# Patient Record
Sex: FEMALE | Race: WHITE | NOT HISPANIC OR LATINO | Employment: OTHER | URBAN - METROPOLITAN AREA
[De-identification: names, ages, dates, MRNs, and addresses within clinical notes are randomized per-mention and may not be internally consistent; named-entity substitution may affect disease eponyms.]

---

## 2017-04-26 DIAGNOSIS — G89.4 CHRONIC PAIN SYNDROME: ICD-10-CM

## 2017-04-26 DIAGNOSIS — G25.81 RESTLESS LEGS SYNDROME (RLS): ICD-10-CM

## 2017-04-26 RX ORDER — TRAMADOL HYDROCHLORIDE 50 MG/1
TABLET ORAL
Qty: 180 TABLET | Refills: 0 | Status: SHIPPED | OUTPATIENT
Start: 2017-04-26 | End: 2017-05-03

## 2017-04-26 NOTE — TELEPHONE ENCOUNTER
traMADol (ULTRAM) 50 MG tablet      Last Written Prescription Date:  11/22/16  Last Fill Quantity: 180,   # refills: 5  Last Office Visit with [facility] or [facility] prescribing provider: 9/14/16  Future Office visit:       Routing refill request to provider for review/approval because:  Drug not on the [facility] or [facility] refill protocol or controlled substance

## 2017-04-27 NOTE — TELEPHONE ENCOUNTER
rOPINIRole (REQUIP) 1 MG tablet     Last Written Prescription Date: 9/14/16  Last Fill Quantity: 90, # refills: 1  Last Office Visit with [facility], [facility] or [facility] prescribing provider: 9/14/16        BP Readings from Last 3 Encounters:   09/14/16 120/70   11/03/15 110/70   10/12/15 111/62           Masoud Faarax  [facility]

## 2017-05-01 RX ORDER — ROPINIROLE 1 MG/1
TABLET, FILM COATED ORAL
Qty: 30 TABLET | Refills: 0 | Status: SHIPPED | OUTPATIENT
Start: 2017-05-01 | End: 2017-11-05

## 2017-05-01 NOTE — TELEPHONE ENCOUNTER
Medication is being filled for 1 time sage refill only due to:  Patient due to be seen in clinic for provider visit.    Alonzo Aguirre RN

## 2017-05-03 DIAGNOSIS — G89.4 CHRONIC PAIN SYNDROME: ICD-10-CM

## 2017-05-03 RX ORDER — TRAMADOL HYDROCHLORIDE 50 MG/1
TABLET ORAL
Qty: 180 TABLET | Refills: 0 | Status: SHIPPED | OUTPATIENT
Start: 2017-05-03 | End: 2017-06-02

## 2017-05-03 NOTE — TELEPHONE ENCOUNTER
Written rx is faxed to [facility] pharmacy at fax # [phone] with start date of today 5/3/17.  Also added not for the pharmacy to call pt when medication is ready for pickup as pt will be going out of town tomorrow and need this med today.  Kelvin Witt,  [facility]

## 2017-06-02 DIAGNOSIS — G89.4 CHRONIC PAIN SYNDROME: ICD-10-CM

## 2017-06-02 RX ORDER — TRAMADOL HYDROCHLORIDE 50 MG/1
TABLET ORAL
Qty: 180 TABLET | Refills: 0 | Status: SHIPPED | OUTPATIENT
Start: 2017-06-02 | End: 2017-06-29

## 2017-06-02 NOTE — TELEPHONE ENCOUNTER
traMADol (ULTRAM) 50 MG tablet      Last Written Prescription Date:  05/03/17  Last Fill Quantity: 180,   # refills: 0  Last Office Visit with [facility] or [facility] prescribing provider: 09/14/16  Future Office visit:       Routing refill request to provider for review/approval because:  Drug not on the [facility], [facility] or [facility] refill protocol or controlled substance      [facility]

## 2017-06-29 DIAGNOSIS — G89.4 CHRONIC PAIN SYNDROME: ICD-10-CM

## 2017-06-29 RX ORDER — TRAMADOL HYDROCHLORIDE 50 MG/1
TABLET ORAL
Qty: 180 TABLET | Refills: 0 | Status: SHIPPED | OUTPATIENT
Start: 2017-06-29 | End: 2017-08-25

## 2017-06-29 NOTE — TELEPHONE ENCOUNTER
rx faxed to pharmacy    Faxed RX June 29, 2017 to fax number [phone]    Right Fax confirmed at 1129 AM    Daniella Montague

## 2017-06-29 NOTE — TELEPHONE ENCOUNTER
traMADol (ULTRAM) 50 MG tablet      Last Written Prescription Date:  06/02/17  Last Fill Quantity: 180,   # refills: 0  Last Office Visit with [facility] or [facility] prescribing provider: 09/14/16  Future Office visit:       Routing refill request to provider for review/approval because:  Drug not on the [facility], [facility] or [facility] refill protocol or controlled substance      Aubrie Souza  [facility]

## 2017-08-25 DIAGNOSIS — G89.4 CHRONIC PAIN SYNDROME: ICD-10-CM

## 2017-08-25 NOTE — TELEPHONE ENCOUNTER
traMADol (ULTRAM) 50 MG tablet      Last Written Prescription Date:  06/29/17  Last Fill Quantity: 180,   # refills: 0  Last Office Visit with [facility] or [facility] prescribing provider: 09/14/16  Future Office visit:       Routing refill request to provider for review/approval because:  Drug not on the [facility], [facility] or [facility] refill protocol or controlled substance      Aubrie Souza  [facility]

## 2017-08-26 RX ORDER — TRAMADOL HYDROCHLORIDE 50 MG/1
TABLET ORAL
Qty: 180 TABLET | Refills: 0 | Status: SHIPPED | OUTPATIENT
Start: 2017-08-26 | End: 2017-10-02

## 2017-10-02 ENCOUNTER — TELEPHONE (OUTPATIENT)
Dept: FAMILY MEDICINE | Facility: CLINIC | Age: 67
End: 2017-10-02

## 2017-10-02 DIAGNOSIS — G89.4 CHRONIC PAIN SYNDROME: ICD-10-CM

## 2017-10-02 NOTE — TELEPHONE ENCOUNTER
Tried to call number on file, male answered and informed me I had the wrong number.    Will send to reception team to have them send letter to remind patient she is due for her mammogram.    If patient calls please help her schedule her mammogram and update her phone number.

## 2017-10-02 NOTE — TELEPHONE ENCOUNTER
traMADol (ULTRAM) 50 MG tablet      Last Written Prescription Date:  08/26/17  Last Fill Quantity: 180,   # refills: 0  Last Office Visit with [facility] or [facility] prescribing provider: 09/14/16  Future Office visit:       Routing refill request to provider for review/approval because:  Drug not on the [facility], [facility] or [facility] refill protocol or controlled substance      Aubrie Souza  [facility]

## 2017-10-03 RX ORDER — TRAMADOL HYDROCHLORIDE 50 MG/1
TABLET ORAL
Qty: 180 TABLET | Refills: 0 | Status: SHIPPED | OUTPATIENT
Start: 2017-10-03 | End: 2017-11-05

## 2017-10-04 NOTE — TELEPHONE ENCOUNTER
Written rx faxed to the pharmacy, Pharmacy will contact pt when medication is ready for pickup.  Kelvin Witt,  [facility]

## 2017-11-05 ENCOUNTER — OFFICE VISIT (OUTPATIENT)
Dept: URGENT CARE | Facility: URGENT CARE | Age: 67
End: 2017-11-05
Payer: COMMERCIAL

## 2017-11-05 VITALS
DIASTOLIC BLOOD PRESSURE: 75 MMHG | WEIGHT: 171 LBS | OXYGEN SATURATION: 96 % | HEART RATE: 84 BPM | SYSTOLIC BLOOD PRESSURE: 144 MMHG | BODY MASS INDEX: 28.9 KG/M2 | TEMPERATURE: 98.3 F

## 2017-11-05 DIAGNOSIS — G89.4 CHRONIC PAIN SYNDROME: ICD-10-CM

## 2017-11-05 PROCEDURE — 99214 OFFICE O/P EST MOD 30 MIN: CPT | Performed by: NURSE PRACTITIONER

## 2017-11-05 RX ORDER — TRAMADOL HYDROCHLORIDE 50 MG/1
TABLET ORAL
Qty: 180 TABLET | Refills: 0 | Status: SHIPPED | OUTPATIENT
Start: 2017-11-05 | End: 2017-11-30

## 2017-11-05 ASSESSMENT — ENCOUNTER SYMPTOMS
BACK PAIN: 1
CARDIOVASCULAR NEGATIVE: 1
CONSTITUTIONAL NEGATIVE: 1
NEUROLOGICAL NEGATIVE: 1
GASTROINTESTINAL NEGATIVE: 1
RESPIRATORY NEGATIVE: 1
EYES NEGATIVE: 1
PSYCHIATRIC NEGATIVE: 1

## 2017-11-05 ASSESSMENT — PAIN SCALES - GENERAL: PAINLEVEL: SEVERE PAIN (7)

## 2017-11-05 NOTE — MR AVS SNAPSHOT
After Visit Summary   11/5/2017    Faviola Bolivar    MRN: [id number]           Patient Information     Date Of Birth          1950        Visit Information        Provider Department      11/5/2017 4:20 PM Antonella Edmondson APRN CNP [facility]        Today's Diagnoses     Chronic pain syndrome           Follow-ups after your visit        Who to contact     If you have questions or need follow up information about today's clinic visit or your schedule please contact [facility] directly at [phone].  Normal or non-critical lab and imaging results will be communicated to you by Broadlink[employer], letter or phone within 4 business days after the clinic has received the results. If you do not hear from us within 7 days, please contact the clinic through [employer] or phone. If you have a critical or abnormal lab result, we will notify you by phone as soon as possible.  Submit refill requests through [employer]egee or call your pharmacy and they will forward the refill request to us. Please allow 3 business days for your refill to be completed.          Additional Information About Your Visit        MyChart Information     [employer] gives you secure access to your electronic health record. If you see a primary care provider, you can also send messages to your care team and make appointments. If you have questions, please call your primary care clinic.  If you do not have a primary care provider, please call [phone] and they will assist you.        Care EveryWhere ID     This is your Care EveryWhere ID. This could be used by other organizations to access your [facility] medical records  [id number]        Your Vitals Were     Pulse Temperature Pulse Oximetry BMI (Body Mass Index)          84 98.3  F (36.8  C) (Oral) 96% 28.9 kg/m2         Blood Pressure from Last 3 Encounters:   11/05/17 144/75   09/14/16 120/70   11/03/15 110/70    Weight from Last 3 Encounters:   11/05/17 171  lb (77.6 kg)   09/14/16 167 lb (75.8 kg)   11/03/15 171 lb 5 oz (77.7 kg)              Today, you had the following     No orders found for display         Today's Medication Changes          These changes are accurate as of: 11/5/17  4:38 PM.  If you have any questions, ask your nurse or doctor.               Stop taking these medicines if you haven't already. Please contact your care team if you have questions.     aspirin 81 MG tablet   Stopped by:  Antonella Edmondson APRN CNP           diclofenac 1 % Gel topical gel   Commonly known as:  VOLTAREN   Stopped by:  Antonella Edmondson APRN CNP           rOPINIRole 1 MG tablet   Commonly known as:  REQUIP   Stopped by:  Antonella Edmondson APRN CNP                Where to get your medicines      Some of these will need a paper prescription and others can be bought over the counter.  Ask your nurse if you have questions.     Bring a paper prescription for each of these medications     traMADol 50 MG tablet                Primary Care Provider Office Phone # Fax #    Citlalli Akbar PA-C [phone]       [address]  [facility] [address]        Equal Access to Services     [facility]: Hadii aad ku hadasho Sojoshuaali, waaxda luqadaha, qaybta kaalmada adeegyada, joe jordan . So [facility] [phone].    ATENCIÓN: Si habla español, tiene a rowland disposición servicios gratuitos de asistencia lingüística. Llame al [phone].    We comply with applicable federal civil rights laws and [place] laws. We do not discriminate on the basis of race, color, national origin, age, disability, sex, sexual orientation, or gender identity.            Thank you!     Thank you for choosing [facility]  for your care. Our goal is always to provide you with excellent care. Hearing back from our patients is one way we can continue to improve our services. Please take a few minutes to complete the written survey that  you may receive in the mail after your visit with us. Thank you!             Your Updated Medication List - Protect others around you: Learn how to safely use, store and throw away your medicines at www.disposemymeds.org.          This list is accurate as of: 11/5/17  4:38 PM.  Always use your most recent med list.                   Brand Name Dispense Instructions for use Diagnosis    sertraline 50 MG tablet    ZOLOFT    90 tablet    TAKE 1 TABLET (50 MG) BY MOUTH DAILY    Major depressive disorder, single episode, moderate (H)       traMADol 50 MG tablet    ULTRAM    180 tablet    TAKE 1-2 TABLETS BY MOUTH UP TO THREE TIMES A DAY AS NEEDED FOR PAIN.    Chronic pain syndrome

## 2017-11-05 NOTE — NURSING NOTE
"Chief Complaint   Patient presents with     Back Pain       Initial /75 (BP Location: Left arm, Patient Position: Chair, Cuff Size: Adult Large)  Pulse 84  Temp 98.3  F (36.8  C) (Oral)  Wt 171 lb (77.6 kg)  SpO2 96%  BMI 28.9 kg/m2 Estimated body mass index is 28.9 kg/(m^2) as calculated from the following:    Height as of 9/14/16: 5' 4.5\" (1.638 m).    Weight as of this encounter: 171 lb (77.6 kg).  Medication Reconciliation: complete   Bobbi Johnson CMA      "

## 2017-11-05 NOTE — PROGRESS NOTES
SUBJECTIVE:   Faviola Bolivar is a 67 year old female presenting with a chief complaint of   Chief Complaint   Patient presents with     Back Pain   .    Onset of symptoms was 3 day(s) ago.  Course of illness is same.    Severity moderate  Current and Associated symptoms: back pain- lower left  Treatment measures tried include tramadol-2 tabsBID.  Predisposing factors include none.  This is chronic back pain, no new or acute pain.   Rx was sent over was not filled yet    Review of Systems   Constitutional: Negative.    HENT: Negative.    Eyes: Negative.    Respiratory: Negative.    Cardiovascular: Negative.    Gastrointestinal: Negative.    Genitourinary: Negative.    Musculoskeletal: Positive for back pain.   Skin: Negative.    Neurological: Negative.    Endo/Heme/Allergies: Negative.    Psychiatric/Behavioral: Negative.          Past Medical History:   Diagnosis Date     Fibromyalgia syndrome      Genital herpes in women      Hyperlipemia      Menopause      Moderate major depression (H)      Osteopenia      Restless legs      Current Outpatient Prescriptions   Medication Sig Dispense Refill     traMADol (ULTRAM) 50 MG tablet TAKE 1-2 TABLETS BY MOUTH UP TO THREE TIMES A DAY AS NEEDED FOR PAIN. 180 tablet 0     sertraline (ZOLOFT) 50 MG tablet TAKE 1 TABLET (50 MG) BY MOUTH DAILY 90 tablet 3     Social History   Substance Use Topics     Smoking status: Never Smoker     Smokeless tobacco: Never Used     Alcohol use Yes      Comment: rarely       OBJECTIVE  /75 (BP Location: Left arm, Patient Position: Chair, Cuff Size: Adult Large)  Pulse 84  Temp 98.3  F (36.8  C) (Oral)  Wt 171 lb (77.6 kg)  SpO2 96%  BMI 28.9 kg/m2    Physical Exam   Constitutional: She is oriented to person, place, and time and well-developed, well-nourished, and in no distress.   Eyes: Conjunctivae and EOM are normal. Pupils are equal, round, and reactive to light.   Neck: Normal range of motion. Neck supple.   Cardiovascular: Normal  rate, regular rhythm and normal heart sounds.    Pulmonary/Chest: Effort normal and breath sounds normal.   Musculoskeletal: Normal range of motion.   Lower left back pain   Neurological: She is alert and oriented to person, place, and time. She has normal reflexes. Gait normal.   Psychiatric: Affect normal.         ASSESSMENT:    (G89.4) Chronic pain syndrome  Plan: traMADol (ULTRAM) 50 MG tablet    Followup:  Patient made aware will not refill again in urgent care, needs to establish care with pcp and have pain managmement through them    NANO Sterling CNP

## 2017-11-10 ENCOUNTER — TELEPHONE (OUTPATIENT)
Dept: URGENT CARE | Facility: URGENT CARE | Age: 67
End: 2017-11-10

## 2017-11-10 NOTE — TELEPHONE ENCOUNTER
No previous refill requests found as reported below by pharmacy. However, our records indicate she was given a Rx in [facility] on 11/5/17 for qty 180.   Routing to provider to review and advise.   Jessika Walton RN

## 2017-11-10 NOTE — TELEPHONE ENCOUNTER
Reason for Call:  Other prescription    Detailed comments: [facility] calling to follow up for they have sent several requests for Tramadol for a refill and have not heard back.  They would like to see if a Rx can be sent as soon as possible.     Phone Number Pharmacy can be reached at: Other phone number:  [phone]    Best Time: anytime    Can we leave a detailed message on this number? YES    Call taken on 11/10/2017 at 9:50 AM by Gagandeep Bonilla

## 2017-11-13 NOTE — TELEPHONE ENCOUNTER
There is obvious reason for not filling this patient's medications: she needs an appointment.  She went to Urgent Care on 11/5/2017 and received a refill.  Prior to Urgent Care appointment on 11/5/2017, she was last seen on 9/14/2016.  Therefore, inform both patient and [facility] that I will not fill this Rx again within one month unless she establishes care with a new care primary provider, notwithstanding Urgent Care visit on 11/5/2017.  She can always see any provider in our clinic to establish care.

## 2017-11-13 NOTE — TELEPHONE ENCOUNTER
Called informed patient & pharmacy Dr. Russell message. Patient filled last refill 11/5/17 at [facility].    DULCE MARIA Berger MA

## 2017-11-30 DIAGNOSIS — G89.4 CHRONIC PAIN SYNDROME: ICD-10-CM

## 2017-11-30 RX ORDER — TRAMADOL HYDROCHLORIDE 50 MG/1
TABLET ORAL
Qty: 180 TABLET | Refills: 0 | Status: SHIPPED | OUTPATIENT
Start: 2017-11-30 | End: 2017-12-27

## 2017-12-01 NOTE — TELEPHONE ENCOUNTER
Written rx for tramadol 50mg is faxed to the pharmacy, Pharmacy will contact pt when medication is ready for pickup.  Kelvin Witt,  [facility]

## 2017-12-21 DIAGNOSIS — F32.1 MAJOR DEPRESSIVE DISORDER, SINGLE EPISODE, MODERATE (H): ICD-10-CM

## 2017-12-21 NOTE — LETTER
December 27, 2017      Faviola Bolivar  [address]   [place] [address]        Dear Faviola Bolivar,      The refill request made by your pharmacy was received at the clinic.     Signed Prescriptions:                        Disp   Refills    sertraline (ZOLOFT) 50 MG tablet           30 tab*0        Sig: TAKE 1 TABLET (50 MG) BY MOUTH DAILY  Authorizing Provider: TOMASA VILLAFANA  Ordering User: RIA HEREDIA      At this time you are due in the clinic for a follow up appointment. A one time sage refill has been sent to your pharmacy.     Please call your clinic to make an appointment with your provider before you run out of medication. This will prevent a delay in your next month's refill.    [facility] [phone]    For your convenience we also offer online appointment scheduling at [email or web address] or [email or web address].     We invite you to refill your next prescription at a [facility] or take advantage of our prescription mail service.      Sincerely,      [facility]

## 2017-12-21 NOTE — TELEPHONE ENCOUNTER
Requested Prescriptions   Pending Prescriptions Disp Refills     sertraline (ZOLOFT) 50 MG tablet [Pharmacy Med Name: Sertraline HCl Oral Tablet 50 MG]  Last Written Prescription Date:  09/14/16  Last Fill Quantity: 90,  # refills: 3   Last Office Visit with [facility], [facility] or [facility] prescribing provider:  09/14/16   Future Office Visit:    90 tablet 2     Sig: TAKE 1 TABLET (50 MG) BY MOUTH DAILY    SSRIs Protocol Failed    12/21/2017  9:33 AM       Failed - PHQ-9 score less than 5 in past 6 months    The PHQ-9 criteria is meant to fail. It requires a PHQ-9 score review         Failed - Recent (6 mo) or future visit with authorizing provider's specialty    Patient had office visit in the last 6 months or has a visit in the next 30 days with authorizing provider.  See chart review.            Passed - Medication is NOT Bupropion    If the medication is Bupropion (Wellbutrin), and the patient is taking for smoking cessation; OK to refill.         Passed - Patient is age 18 or older       Passed - No active pregnancy on record       Passed - No positive pregnancy test in last 12 months

## 2017-12-27 DIAGNOSIS — G89.4 CHRONIC PAIN SYNDROME: ICD-10-CM

## 2017-12-27 NOTE — TELEPHONE ENCOUNTER
PHQ-9 SCORE 8/27/2015 11/3/2015 9/6/2016   Total Score - - -   Total Score 8 2 1   Please call to schedule appointment.  Medication is being filled for 1 time refill only due to:  Patient needs to be seen because it has been more than one year since last visit.   Lisa Mckeon RN

## 2017-12-27 NOTE — TELEPHONE ENCOUNTER
Requested Prescriptions   Pending Prescriptions Disp Refills     traMADol (ULTRAM) 50 MG tablet  Last Written Prescription Date:  11/30/17  Last Fill Quantity: 180,  # refills: 0   Last Office Visit with [facility], [facility] or [facility] prescribing provider:  09/14/16   Future Office Visit:    180 tablet 0    There is no refill protocol information for this order

## 2017-12-27 NOTE — TELEPHONE ENCOUNTER
Vandana refill letter is mailed to patient's home address to call our appointment line to schedule a appointment.  Kelvin Witt,  [facility]

## 2017-12-28 NOTE — TELEPHONE ENCOUNTER
Routing refill request to provider for review/approval because:  Drug not on the [facility] refill protocol   traMADol (ULTRAM) 50 MG tablet 180 tablet 0 11/30/2017  No   Sig: TAKE 1 TO 2 TABLETS BY MOUTH UP TO THREE TIMES DAILY AS NEEDED FOR PAIN   Class: Local Print   Order: [id number]

## 2018-01-02 RX ORDER — TRAMADOL HYDROCHLORIDE 50 MG/1
TABLET ORAL
Qty: 180 TABLET | Refills: 0 | Status: SHIPPED | OUTPATIENT
Start: 2018-01-02 | End: 2018-01-29

## 2018-01-29 DIAGNOSIS — G89.4 CHRONIC PAIN SYNDROME: ICD-10-CM

## 2018-01-30 RX ORDER — TRAMADOL HYDROCHLORIDE 50 MG/1
TABLET ORAL
Qty: 90 TABLET | Refills: 0 | Status: SHIPPED | OUTPATIENT
Start: 2018-01-30 | End: 2018-02-15

## 2018-01-30 NOTE — TELEPHONE ENCOUNTER
Requested Prescriptions   Pending Prescriptions Disp Refills     traMADol (ULTRAM) 50 MG tablet 180 tablet 0     Sig: TAKE 1 TO 2 TABLETS BY MOUTH UP TO THREE TIMES DAILY AS NEEDED FOR PAIN    There is no refill protocol information for this order        Last Written Prescription Date:  1/2/18  Last Fill Quantity: 180,  # refills: 0   Last Office Visit with [facility], [facility] or [facility] prescribing provider:  9/14/2016     Future Office Visit:

## 2018-01-30 NOTE — TELEPHONE ENCOUNTER
Faxed RX January 30, 2018 to fax number [phone]    Right Fax confirmed at 4:38 PM    DULCE MARIA Berger MA

## 2018-02-15 DIAGNOSIS — G89.4 CHRONIC PAIN SYNDROME: ICD-10-CM

## 2018-02-15 NOTE — TELEPHONE ENCOUNTER
Reason for Call:  Medication or medication refill:    Do you use a [facility]?  Name of the pharmacy and phone number for the current request:  [email or web address] [address] [address], [place] - [address]    Name of the medication requested: Pending Prescriptions:                       Disp   Refills    traMADol (ULTRAM) 50 MG tablet            90 tab*0            Sig: TAKE 1 TO 2 TABLETS BY MOUTH UP TO THREE TIMES           DAILY AS NEEDED FOR PAIN      Other request: Needs rush order allout. Please call when approved.    Can we leave a detailed message on this number? YES    Phone number patient can be reached at: Home number on file [phone] (home)    Best Time: any    Call taken on 2/15/2018 at 1:00 PM by Farrah Carlos

## 2018-02-16 RX ORDER — TRAMADOL HYDROCHLORIDE 50 MG/1
TABLET ORAL
Qty: 90 TABLET | Refills: 0 | Status: SHIPPED | OUTPATIENT
Start: 2018-02-16 | End: 2018-03-01

## 2018-02-16 NOTE — TELEPHONE ENCOUNTER
Written rx faxed to the pharmacy, Pharmacy will contact pt when medication is ready for pickup.  Kelvin Witt,  [facility]    Patient is notified script was faxed.  Kelvin Witt,  [facility]

## 2018-02-27 ENCOUNTER — TELEPHONE (OUTPATIENT)
Dept: FAMILY MEDICINE | Facility: CLINIC | Age: 68
End: 2018-02-27

## 2018-02-27 DIAGNOSIS — G89.4 CHRONIC PAIN SYNDROME: ICD-10-CM

## 2018-02-27 NOTE — TELEPHONE ENCOUNTER
Requested Prescriptions   Pending Prescriptions Disp Refills     traMADol (ULTRAM) 50 MG tablet  Last Written Prescription Date:  02/16/18  Last Fill Quantity: 90,  # refills: 0   Last Office Visit with [facility], [facility] or [facility] prescribing provider:  09/14/16   Future Office Visit:    90 tablet 0     Sig: TAKE 1 TO 2 TABLETS BY MOUTH UP TO THREE TIMES DAILY AS NEEDED FOR PAIN    There is no refill protocol information for this order

## 2018-02-28 NOTE — TELEPHONE ENCOUNTER
Routing refill request to provider for review/approval because:  Drug not on the [facility] refill protocol   Yancy Shetty RN

## 2018-03-01 ENCOUNTER — OFFICE VISIT (OUTPATIENT)
Dept: FAMILY MEDICINE | Facility: CLINIC | Age: 68
End: 2018-03-01
Payer: COMMERCIAL

## 2018-03-01 DIAGNOSIS — Z12.31 ENCOUNTER FOR SCREENING MAMMOGRAM FOR BREAST CANCER: ICD-10-CM

## 2018-03-01 DIAGNOSIS — G89.4 CHRONIC PAIN SYNDROME: Primary | ICD-10-CM

## 2018-03-01 DIAGNOSIS — F32.1 MAJOR DEPRESSIVE DISORDER, SINGLE EPISODE, MODERATE (H): ICD-10-CM

## 2018-03-01 DIAGNOSIS — Z78.0 ASYMPTOMATIC MENOPAUSAL STATE: ICD-10-CM

## 2018-03-01 PROCEDURE — 99214 OFFICE O/P EST MOD 30 MIN: CPT | Performed by: INTERNAL MEDICINE

## 2018-03-01 PROCEDURE — 99000 SPECIMEN HANDLING OFFICE-LAB: CPT | Performed by: INTERNAL MEDICINE

## 2018-03-01 PROCEDURE — 80307 DRUG TEST PRSMV CHEM ANLYZR: CPT | Mod: 90 | Performed by: INTERNAL MEDICINE

## 2018-03-01 RX ORDER — TRAMADOL HYDROCHLORIDE 50 MG/1
TABLET ORAL
Qty: 42 TABLET | Refills: 0 | Status: SHIPPED | OUTPATIENT
Start: 2018-03-01 | End: 2018-03-01

## 2018-03-01 RX ORDER — TRAMADOL HYDROCHLORIDE 50 MG/1
TABLET ORAL
Qty: 180 TABLET | Refills: 5 | Status: SHIPPED | OUTPATIENT
Start: 2018-03-01 | End: 2018-05-15

## 2018-03-01 RX ORDER — TRAMADOL HYDROCHLORIDE 50 MG/1
TABLET ORAL
Qty: 90 TABLET | Refills: 0 | OUTPATIENT
Start: 2018-03-01

## 2018-03-01 ASSESSMENT — PAIN SCALES - GENERAL: PAINLEVEL: MODERATE PAIN (5)

## 2018-03-01 NOTE — MR AVS SNAPSHOT
After Visit Summary   3/1/2018    Faviola Bolivar    MRN: [id number]           Patient Information     Date Of Birth          1950        Visit Information        Provider Department      3/1/2018 4:40 PM Lazarus Russell MD [facility]        Today's Diagnoses     Chronic pain syndrome    -  1    Major depressive disorder, single episode, moderate (H)        Asymptomatic menopausal state        Encounter for screening mammogram for breast cancer          Care Instructions    At [facility], we strive to deliver an exceptional experience to you, every time we see you.  If you receive a survey in the mail, please send us back your thoughts. We really do value your feedback.    Based on your medical history, these are the current health maintenance/preventive care services that you are due for (some may have been done at this visit.)  Health Maintenance Due   Topic Date Due     URINE DRUG SCREEN Q1 YR  01/08/1965     HEPATITIS C SCREENING  01/08/1968     DEXA SCAN SCREENING (SYSTEM ASSIGNED)  01/08/2015     PHQ-9 Q6 MONTHS  02/28/2017     MAMMO SCREEN Q2 YR (SYSTEM ASSIGNED)  05/07/2017     ASHLEE QUESTIONNAIRE 1 YEAR  08/30/2017     FALL RISK ASSESSMENT  09/14/2017     DEPRESSION ACTION PLAN Q1 YR  09/14/2017         Suggested websites for health information:  Www.[place].org : Up to date and easily searchable information on multiple topics.  Www.medlineplus.gov : medication info, interactive tutorials, watch real surgeries online  Www.familydoctor.org : good info from the Academy of Family Physicians  Www.cdc.gov : public health info, travel advisories, epidemics (H1N1)  Www.aap.org : children's health info, normal development, vaccinations  Www.health.state[place]us : [place] dept of health, public health issues in [place], N1N1    Your care team:                            Family Medicine Internal Medicine   MD Lazarus Bowles MD Shantel Branch-Fleming,  MD Carolyne Bucio MD Pediatrics   GRIS Hernandez, MD Leigha Sanchez CNP, MD Deborah Mielke, MD Kim Thein, APRN CNP      Clinic hours: Monday - Thursday 7 am-7 pm; Fridays 7 am-5 pm.   Urgent care: Monday - Friday 11 am-9 pm; Saturday and Sunday 9 am-5 pm.  Pharmacy : Monday -Thursday 8 am-8 pm; Friday 8 am-6 pm; Saturday and Sunday 9 am-5 pm.     Clinic: [phone]   Pharmacy: [phone]            Follow-ups after your visit        Future tests that were ordered for you today     Open Future Orders        Priority Expected Expires Ordered    DX Hip/Pelvis/Spine Routine  3/1/2019 3/1/2018    *MA Screening Digital Bilateral Routine  3/1/2019 3/1/2018            Who to contact     If you have questions or need follow up information about today's clinic visit or your schedule please contact [facility] directly at [phone].  Normal or non-critical lab and imaging results will be communicated to you by MyChart, letter or phone within 4 business days after the clinic has received the results. If you do not hear from us within 7 days, please contact the clinic through [employer] or phone. If you have a critical or abnormal lab result, we will notify you by phone as soon as possible.  Submit refill requests through Z[employer] or call your pharmacy and they will forward the refill request to us. Please allow 3 business days for your refill to be completed.          Additional Information About Your Visit        [employer] Information     [employer] gives you secure access to your electronic health record. If you see a primary care provider, you can also send messages to your care team and make appointments. If you have questions, please call your primary care clinic.  If you do not have a primary care provider, please call [phone] and they will assist you.        Care EveryWhere ID     This is your Care EveryWhere  "ID. This could be used by other organizations to access your [facility] medical records  [id number]        Your Vitals Were     Pulse Temperature Height Pulse Oximetry BMI (Body Mass Index)       80 99.1  F (37.3  C) (Oral) 5' 4.75\" (1.645 m) 98% 28.51 kg/m2        Blood Pressure from Last 3 Encounters:   03/01/18 144/74   11/05/17 144/75   09/14/16 120/70    Weight from Last 3 Encounters:   03/01/18 170 lb (77.1 kg)   11/05/17 171 lb (77.6 kg)   09/14/16 167 lb (75.8 kg)              We Performed the Following     DEPRESSION ACTION PLAN (DAP)     Drug  Screen Comprehensive, Urine w/o Reported Meds (Pain Care Package)          Today's Medication Changes          These changes are accurate as of 3/1/18  5:53 PM.  If you have any questions, ask your nurse or doctor.               These medicines have changed or have updated prescriptions.        Dose/Directions    sertraline 50 MG tablet   Commonly known as:  ZOLOFT   This may have changed:  See the new instructions.   Used for:  Major depressive disorder, single episode, moderate (H)   Changed by:  Lazarus Russell MD        TAKE 1 TABLET (50 MG) BY MOUTH DAILY   Quantity:  90 tablet   Refills:  3            Where to get your medicines      These medications were sent to [facility] [address] [place] [address] & [address] [address]     Phone:  [phone]     sertraline 50 MG tablet         Some of these will need a paper prescription and others can be bought over the counter.  Ask your nurse if you have questions.     Bring a paper prescription for each of these medications     traMADol 50 MG tablet                Primary Care Provider    Citlalli Akbar PA-C       No address on file        Equal Access to Services     LILIANA GARIBAY AH: Milly Conti, alesia garcia, maria nguyen, joe gonzalez. So [facility] [phone].    ATENCIÓN: Si habla " español, tiene a [place] disposición servicios gratuitos de asistencia lingüística. Jessica bedoya [phone].    We comply with applicable federal civil rights laws and [place] laws. We do not discriminate on the basis of race, color, national origin, age, disability, sex, sexual orientation, or gender identity.            Thank you!     Thank you for choosing [facility]  for your care. Our goal is always to provide you with excellent care. Hearing back from our patients is one way we can continue to improve our services. Please take a few minutes to complete the written survey that you may receive in the mail after your visit with us. Thank you!             Your Updated Medication List - Protect others around you: Learn how to safely use, store and throw away your medicines at www.disposemymeds.org.          This list is accurate as of 3/1/18  5:53 PM.  Always use your most recent med list.                   Brand Name Dispense Instructions for use Diagnosis    sertraline 50 MG tablet    ZOLOFT    90 tablet    TAKE 1 TABLET (50 MG) BY MOUTH DAILY    Major depressive disorder, single episode, moderate (H)       traMADol 50 MG tablet    ULTRAM    180 tablet    TAKE 1 TO 2 TABLETS BY MOUTH UP TO THREE TIMES DAILY AS NEEDED FOR PAIN    Chronic pain syndrome

## 2018-03-01 NOTE — NURSING NOTE
Patient informed due for mammogram.  Patient will call and schedule mammogram.    Kirt Sevilla MA

## 2018-03-01 NOTE — LETTER
My Depression Action Plan  Name: Faviola Bolivar   Date of Birth 1950  Date: 3/1/2018    My doctor: Citlalli Akbar   My clinic: [address] [id number] [phone]          GREEN    ZONE   Good Control    What it looks like:     Things are going generally well. You have normal up s and down s. You may even feel depressed from time to time, but bad moods usually last less than a day.   What you need to do:  1. Continue to care for yourself (see self care plan)  2. Check your depression survival kit and update it as needed  3. Follow your physician s recommendations including any medication.  4. Do not stop taking medication unless you consult with your physician first.           YELLOW         ZONE Getting Worse    What it looks like:     Depression is starting to interfere with your life.     It may be hard to get out of bed; you may be starting to isolate yourself from others.    Symptoms of depression are starting to last most all day and this has happened for several days.     You may have suicidal thoughts but they are not constant.   What you need to do:     1. Call your care team, your response to treatment will improve if you keep your care team informed of your progress. Yellow periods are signs an adjustment may need to be made.     2. Continue your self-care, even if you have to fake it!    3. Talk to someone in your support network    4. Open up your depression survival kit           RED    ZONE Medical Alert - Get Help    What it looks like:     Depression is seriously interfering with your life.     You may experience these or other symptoms: You can t get out of bed most days, can t work or engage in other necessary activities, you have trouble taking care of basic hygiene, or basic responsibilities, thoughts of suicide or death that will not go away, self-injurious behavior.     What you need to do:  1. Call your  care team and request a same-day appointment. If they are not available (weekends or after hours) call your local crisis line, emergency room or 911.      Electronically signed by: Kirt Sevilla, March 1, 2018    Depression Self Care Plan / Survival Kit    Self-Care for Depression  Here s the deal. Your body and mind are really not as separate as most people think.  What you do and think affects how you feel and how you feel influences what you do and think. This means if you do things that people who feel good do, it will help you feel better.  Sometimes this is all it takes.  There is also a place for medication and therapy depending on how severe your depression is, so be sure to consult with your medical provider and/ or Behavioral Health Consultant if your symptoms are worsening or not improving.     In order to better manage my stress, I will:    Exercise  Get some form of exercise, every day. This will help reduce pain and release endorphins, the  feel good  chemicals in your brain. This is almost as good as taking antidepressants!  This is not the same as joining a gym and then never going! (they count on that by the way ) It can be as simple as just going for a walk or doing some gardening, anything that will get you moving.      Hygiene   Maintain good hygiene (Get out of bed in the morning, Make your bed, Brush your teeth, Take a shower, and Get dressed like you were going to work, even if you are unemployed).  If your clothes don't fit try to get ones that do.    Diet  I will strive to eat foods that are good for me, drink plenty of water, and avoid excessive sugar, caffeine, alcohol, and other mood-altering substances.  Some foods that are helpful in depression are: complex carbohydrates, B vitamins, flaxseed, fish or fish oil, fresh fruits and vegetables.    Psychotherapy  I agree to participate in Individual Therapy (if recommended).    Medication  If prescribed medications, I agree to take them.   Missing doses can result in serious side effects.  I understand that drinking alcohol, or other illicit drug use, may cause potential side effects.  I will not stop my medication abruptly without first discussing it with my provider.    Staying Connected With Others  I will stay in touch with my friends, family members, and my primary care provider/team.    Use your imagination  Be creative.  We all have a creative side; it doesn t matter if it s oil painting, sand castles, or mud pies! This will also kick up the endorphins.    Witness Beauty  (AKA stop and smell the roses) Take a look outside, even in mid-winter. Notice colors, textures. Watch the squirrels and birds.     Service to others  Be of service to others.  There is always someone else in need.  By helping others we can  get out of ourselves  and remember the really important things.  This also provides opportunities for practicing all the other parts of the program.    Humor  Laugh and be silly!  Adjust your TV habits for less news and crime-drama and more comedy.    Control your stress  Try breathing deep, massage therapy, biofeedback, and meditation. Find time to relax each day.     My support system    Clinic Contact:  Phone number:    Contact 1:  Phone number:    Contact 2:  Phone number:    Rastafarian/:  Phone number:    Therapist:  Phone number:    Local crisis center:    Phone number:    Other community support:  Phone number:

## 2018-03-01 NOTE — TELEPHONE ENCOUNTER
Despite her chronic pain issues, a clinic visit within the past 6 -12 months is reasonable enough to continue prescribing any opiate medication.  Unfortunately, this patient has not seen any provider in our clinic since she saw Citlalli Akbar last 9/14/16.    For this reason,  Rx request for Tramadol was denied by this provider.  I will only allow one week supply so she can establish care with new PCP.

## 2018-03-01 NOTE — TELEPHONE ENCOUNTER
Called informed patient  Vocal message below. Appointment scheduled for today 4:40.    DULCE MARIA Berger MA

## 2018-03-01 NOTE — PATIENT INSTRUCTIONS
At [facility], we strive to deliver an exceptional experience to you, every time we see you.  If you receive a survey in the mail, please send us back your thoughts. We really do value your feedback.    Based on your medical history, these are the current health maintenance/preventive care services that you are due for (some may have been done at this visit.)  Health Maintenance Due   Topic Date Due     URINE DRUG SCREEN Q1 YR  01/08/1965     HEPATITIS C SCREENING  01/08/1968     DEXA SCAN SCREENING (SYSTEM ASSIGNED)  01/08/2015     PHQ-9 Q6 MONTHS  02/28/2017     MAMMO SCREEN Q2 YR (SYSTEM ASSIGNED)  05/07/2017     ASHLEE QUESTIONNAIRE 1 YEAR  08/30/2017     FALL RISK ASSESSMENT  09/14/2017     DEPRESSION ACTION PLAN Q1 YR  09/14/2017         Suggested websites for health information:  Www.Next 1 Interactive.[employer]Log[employer] : Up to date and easily searchable information on multiple topics.  Www.[employer].gov : medication info, interactive tutorials, watch real surgeries online  Www.familydoctor.org : good info from the Academy of Family Physicians  Www.cdc.gov : public health info, travel advisories, epidemics (H1N1)  Www.aap.org : children's health info, normal development, vaccinations  Www.health.[facility].[place].us : [place] dept of health, public health issues in [place], N1N1    Your care team:                            Family Medicine Internal Medicine   MD Lazarus Bowles MD Shantel Branch-Fleming, MD Katya Georgiev PA-C Nam Ho, MD Pediatrics   GRIS Hernandez, MD Leigha Sanchez CNP, MD Deborah Mielke, MD Kim Thein, NANO CNP      Clinic hours: Monday - Thursday 7 am-7 pm; Fridays 7 am-5 pm.   Urgent care: Monday - Friday 11 am-9 pm; Saturday and Sunday 9 am-5 pm.  Pharmacy : Monday -Thursday 8 am-8 pm; Friday 8 am-6 pm; Saturday and Sunday 9 am-5 pm.     Clinic: [phone]   Pharmacy: [phone]

## 2018-03-01 NOTE — LETTER
[facility]    03/01/18    Patient: Faviola Bolivar  YOB: 1950  Medical Record Number: [id number]                                                                  Controlled Substance Agreement  I understand that my care provider has prescribed controlled substances (narcotics, tranquilizers, and/or stimulants) to help manage my condition(s).  I am taking this medicine to help me function or work.  I know that this is strong medicine.  It could have serious side effects and even cause a dependency on the drug.  If I stop these medicines suddenly, I could have severe withdrawal symptoms.    The risks, benefits, and side effects of these medication(s) were explained to me.  I agree that:  1. I will take part in other treatments as advised by my provider.  This may be psychiatry or counseling, physical therapy, behavioral therapy, group treatment, or a referral to a pain clinic.  I will reduce or stop my medicine when my provider tells me to do so.   2. I will take my medicines as prescribed.  I will not change the dose or schedule unless my provider tells me to.  There will be no refills if I  run out early.   I may be contacted at any time without warning and asked to complete a drug test or pill count.   3. I will keep all my appointments at the clinic.  If I miss appointments or fail to follow instructions, my provider may stop my medicine.  4. I will not ask other providers to prescribe controlled substances. And I will not accept controlled substances from other people. If I need another prescribed controlled substance for a new reason, I will notify my provider within one business day.  5. If I enroll in the [place] Medical Marijuana program, I will tell my provider.  I will also sign an agreement to share my medical records with my provider.  6. I will use one pharmacy to fill all of my controlled substance prescriptions.  If my prescription is mailed to my pharmacy, it may take  5 to 7 days for my medicine to be ready.  7. I understand that my provider, clinic care team, and pharmacy can track controlled substance prescriptions from other providers through a central database (prescription monitoring program).  8. I will bring in my list of medications (or my medicine bottles) each time I come to the clinic.  REV- 04/2016                                                                                                                                            Page 1 of 2      [facility]    03/01/18    Patient: Faviola Bolivar  YOB: 1950  Medical Record Number: [id number]    9. Refills of controlled substances will be made only during office hours.  It is up to me to make sure that I do not run out of my medicines on weekends or holidays.    10. I am responsible for my prescriptions.  If the medicine is lost or stolen, it will not be replaced.   I also agree not to share these medicines with anyone.  11. I agree to not use ANY illegal or recreational drugs.  This includes marijuana, cocaine, bath salts or other drugs.  I agree not to use alcohol unless my provider says I may.  I agree to give urine samples whenever asked.  If I fail to give a urine sample, the provider may stop my medicine.     12. I will tell my nurse or provider right away if I become pregnant or have a new medical problem treated outside of [facility].  13. I understand that this medicine can affect my thinking and judgment.  It may be unsafe for me to drive, use machinery and do dangerous tasks.  I will not do any of these things until I know how the medicine affects me.  If my dose changes, I will wait to see how it affects me.  I will contact my provider if I have concerns about medicine side effects.  I understand that if I do not follow any of the conditions above, my prescriptions or treatment may be stopped.    I agree that my provider, clinic care team, and pharmacy may work with  any city, state or federal law enforcement agency that investigates the misuse, sale, or other diversion of my controlled medicine. I will allow my provider to discuss my care with or share a copy of this agreement with any other treating provider, pharmacy or emergency room where I receive care.  I agree to give up (waive) any right of privacy or confidentiality with respect to these authorizations.   I have read this agreement and have asked questions about anything I did not understand.   ___________________________________    ___________________________  Patient Signature                                                           Date and Time  ___________________________________     ____________________________  Witness                                                                            Date and Time  ___________________________________  Lazarus Russell MD  REV-  04/2016                                                                                                                                                                 Page 2 of 2

## 2018-03-01 NOTE — PROGRESS NOTES
SUBJECTIVE:   Faviola Bolivar is a 68 year old female who presents to clinic today for the following health issues:    Back Pain Follow Up      Description:   Location of pain:  Left lumbar area  Character of pain: stabbing  Pain radiation: Does not radiate  Since last visit, pain is:  unchanged  New numbness or weakness in legs, not attributed to pain:  no     Intensity: Currently 5/10    History:   Pain interferes with job: Not applicable  Therapies tried without relief: PT  Therapies tried with relief: none     Accompanying Signs & Symptoms:  Risk of Fracture:  None  Risk of Cauda Equina:  None  Risk of Infection:  None  Risk of Cancer:  None  Amount of exercise or physical activity: None    Problems taking medications regularly: No    Medication side effects: none    Diet: regular (no restrictions)      Depression Followup    Status since last visit: Improved     See PHQ-9 for current symptoms.  Other associated symptoms: None    Complicating factors:   Significant life event:  No   Current substance abuse:  None  Anxiety or Panic symptoms:  No    PHQ-9 11/3/2015 9/6/2016 3/1/2018   Total Score 2 1 2   Q9: Suicide Ideation Not at all Not at all Not at all       Problem list and histories reviewed & adjusted, as indicated.  Additional history: as documented    Patient Active Problem List   Diagnosis     Restless legs     Osteopenia     Fibromyalgia syndrome     Moderate major depression (H)     Restless leg syndrome     Hyperlipidemia LDL goal <130     Advanced directives, counseling/discussion     Overweight (BMI 25.0-29.9)     Vitamin D deficiency     Chronic pain     Diverticulosis of large intestine     Pain medication agreement     Primary osteoarthritis of left knee     Past Surgical History:   Procedure Laterality Date     APPENDECTOMY       ARTHROSCOPY KNEE RT/LT  2007    left      BACK SURGERY  2010    lumbar fusion     C NONSPECIFIC PROCEDURE  2002     COLONOSCOPY N/A 10/12/2015    Procedure:  COLONOSCOPY;  Surgeon: Emeka Tejeda MD;  Location: [facility]     COLONOSCOPY WITH CO2 INSUFFLATION N/A 10/12/2015    Procedure: COLONOSCOPY WITH CO2 INSUFFLATION;  Surgeon: Emeka Tejeda MD;  Location: [facility] OR     D & C       HC REMOVAL GALLBLADDER         Social History   Substance Use Topics     Smoking status: Never Smoker     Smokeless tobacco: Never Used     Alcohol use Yes      Comment: rarely     Family History   Problem Relation Age of Onset     Breast Cancer Mother      CANCER Mother      cervical         Current Outpatient Prescriptions   Medication Sig Dispense Refill     traMADol (ULTRAM) 50 MG tablet TAKE 1 TO 2 TABLETS BY MOUTH UP TO THREE TIMES DAILY AS NEEDED FOR PAIN 180 tablet 5     sertraline (ZOLOFT) 50 MG tablet TAKE 1 TABLET (50 MG) BY MOUTH DAILY 90 tablet 3     Allergies   Allergen Reactions     Nka [No Known Allergies]      Recent Labs   Lab Test  08/27/15   1412  12/02/10   1202  07/05/10   0934   LDL  79   --   135*   HDL  49*   --   49*   TRIG  313*   --   100   ALT  26   --   19   CR  0.64  0.59   --    GFRESTIMATED  >90  Non  GFR Calc    >90   --    GFRESTBLACK  >90   GFR Calc    >90   --    POTASSIUM  4.0  4.0   --    TSH  1.08   --    --        Wt Readings from Last 3 Encounters:   03/01/18 170 lb (77.1 kg)   11/05/17 171 lb (77.6 kg)   09/14/16 167 lb (75.8 kg)               ROS:  CONSTITUTIONAL: NEGATIVE for fever, chills, change in weight  INTEGUMENTARY/SKIN: NEGATIVE for worrisome rashes, moles or lesions  EYES: NEGATIVE for vision changes or irritation  ENT/MOUTH: NEGATIVE for ear, mouth and throat problems  RESP: NEGATIVE for significant cough or SOB  BREAST: NEGATIVE for masses, tenderness or discharge  CV: NEGATIVE for chest pain, palpitations or peripheral edema  GI: NEGATIVE for nausea, abdominal pain, heartburn, or change in bowel habits  : NEGATIVE for frequency, dysuria, or hematuria  MUSCULOSKELETAL:Refer to above.  NEURO:  "NEGATIVE for weakness, dizziness or paresthesias  ENDOCRINE: NEGATIVE for temperature intolerance, skin/hair changes  HEME: NEGATIVE for bleeding problems  PSYCHIATRIC: NEGATIVE for changes in mood or affect    OBJECTIVE:     /74 (BP Location: Left arm, Patient Position: Chair, Cuff Size: Adult Regular)  Pulse 80  Temp 99.1  F (37.3  C) (Oral)  Ht 5' 4.75\" (1.645 m)  Wt 170 lb (77.1 kg)  SpO2 98%  BMI 28.51 kg/m2  Body mass index is 28.51 kg/(m^2).  GENERAL: healthy, alert and no distress  EYES: Eyes grossly normal to inspection, PERRL and conjunctivae and sclerae normal  HENT: ear canals and TM's normal, nose and mouth without ulcers or lesions  NECK: no adenopathy, no asymmetry, masses, or scars and thyroid normal to palpation  RESP: lungs clear to auscultation - no rales, rhonchi or wheezes  CV: regular rate and rhythm, normal S1 S2, no S3 or S4, no murmur, click or rub, no peripheral edema and peripheral pulses strong  ABDOMEN: soft, nontender, no hepatosplenomegaly, no masses and bowel sounds normal  MS: no gross musculoskeletal defects noted, no edema  SKIN: no suspicious lesions or rashes  NEURO: Normal strength and tone, mentation intact and speech normal  PSYCH: mentation appears normal, affect normal/bright    Diagnostic Test Results:  none     ASSESSMENT/PLAN:   (G89.4) Chronic pain syndrome  (primary encounter diagnosis)  Comment: Source of pain is low back area, S/P lumbar spinal fusion at L5-S1 8 years ago. Well-controlled with current regimen. Controlled substance agreement signed.  Plan: traMADol (ULTRAM) 50 MG tablet, Drug  Screen         Comprehensive, Urine w/o Reported Meds (Pain         Care Package)            (F32.1) Major depressive disorder, single episode, moderate (H)  Comment: Well-controlled with current regimen.  Plan: DEPRESSION ACTION PLAN (DAP), sertraline         (ZOLOFT) 50 MG tablet            (Z78.0) Asymptomatic menopausal state  Comment: Overdue for test.  Plan: DX " Hip/Pelvis/Spine            (Z12.31) Encounter for screening mammogram for breast cancer  Comment: Overdue for test.   Plan: *MA Screening Digital Bilateral         Follow-up within 6 - 12 months.      Lazarus Russell MD  [facility]

## 2018-03-01 NOTE — TELEPHONE ENCOUNTER
Reason for Call:  Other prescription    Detailed comments: Pt calling to follow up on medication request and would like for Dr. Russell to send in Rx as soon as possible for she has been waiting for three days and is out.  She would like a call back to confirm Rx has been sent.    Phone Number Patient can be reached at: Home number on file [phone] (home)    Best Time: anytime    Can we leave a detailed message on this number? YES    Call taken on 3/1/2018 at 11:27 AM by Gagandeep Bonilla

## 2018-03-02 ASSESSMENT — PATIENT HEALTH QUESTIONNAIRE - PHQ9: SUM OF ALL RESPONSES TO PHQ QUESTIONS 1-9: 2

## 2018-03-05 VITALS
DIASTOLIC BLOOD PRESSURE: 80 MMHG | OXYGEN SATURATION: 98 % | HEIGHT: 65 IN | SYSTOLIC BLOOD PRESSURE: 135 MMHG | TEMPERATURE: 99.1 F | WEIGHT: 170 LBS | HEART RATE: 80 BPM | BODY MASS INDEX: 28.32 KG/M2

## 2018-03-05 PROBLEM — G89.4 CHRONIC PAIN SYNDROME: Status: ACTIVE | Noted: 2018-03-05

## 2018-03-07 LAB — COMPREHEN DRUG ANALYSIS UR: NORMAL

## 2018-03-14 ENCOUNTER — RADIANT APPOINTMENT (OUTPATIENT)
Dept: MAMMOGRAPHY | Facility: CLINIC | Age: 68
End: 2018-03-14
Attending: INTERNAL MEDICINE
Payer: COMMERCIAL

## 2018-03-14 DIAGNOSIS — Z12.31 ENCOUNTER FOR SCREENING MAMMOGRAM FOR BREAST CANCER: ICD-10-CM

## 2018-03-14 PROCEDURE — 77067 SCR MAMMO BI INCL CAD: CPT | Performed by: STUDENT IN AN ORGANIZED HEALTH CARE EDUCATION/TRAINING PROGRAM

## 2018-03-21 ENCOUNTER — RADIANT APPOINTMENT (OUTPATIENT)
Dept: BONE DENSITY | Facility: CLINIC | Age: 68
End: 2018-03-21
Attending: INTERNAL MEDICINE
Payer: COMMERCIAL

## 2018-03-21 DIAGNOSIS — Z78.0 ASYMPTOMATIC MENOPAUSAL STATE: ICD-10-CM

## 2018-03-21 PROCEDURE — 77080 DXA BONE DENSITY AXIAL: CPT | Mod: 59 | Performed by: RADIOLOGY

## 2018-03-21 PROCEDURE — 77081 DXA BONE DENSITY APPENDICULR: CPT | Performed by: RADIOLOGY

## 2018-05-15 DIAGNOSIS — G89.4 CHRONIC PAIN SYNDROME: ICD-10-CM

## 2018-05-15 RX ORDER — TRAMADOL HYDROCHLORIDE 50 MG/1
TABLET ORAL
Qty: 90 TABLET | Refills: 5 | Status: SHIPPED | OUTPATIENT
Start: 2018-05-15 | End: 2018-07-31

## 2018-05-15 NOTE — TELEPHONE ENCOUNTER
Called the pharmacy and the patient has used all 6 available rx's on the tramadol since 3/1/18. The dates are as follows: 3/1, 3/4, 3/27, 4/8, 4/21 and 5/3.    Provider to address request.    Katrina Fox RN, [facility]

## 2018-05-15 NOTE — TELEPHONE ENCOUNTER
Reason for Call:  Other prescription    Detailed comments: Pt calling to follow up on medication request and would like to see if Dr. Russell can send in Rx to pharmacy as soon as possible for Pt is dealing with a lot of back pain.  She would like a call back to confirm Rx is ready.     Phone Number Patient can be reached at: Home number on file [phone] (home)    Best Time: anytime    Can we leave a detailed message on this number? YES    Call taken on 5/15/2018 at 1:27 PM by Gagandeep Bonilla

## 2018-05-15 NOTE — TELEPHONE ENCOUNTER
Reason for Call:  Other prescription    Detailed comments: patient checking status for refill. Patient is requesting a rush on this request, she states she's having a lot of back pain.     Phone Number Patient can be reached at: Home number on file [phone] (home)    Best Time: anytime     Can we leave a detailed message on this number? YES    Call taken on 5/15/2018 at 10:43 AM by Lily Martel

## 2018-05-15 NOTE — TELEPHONE ENCOUNTER
[place] monitoring [place] reviewed.  The patient has filled Tramadol every two weeks, with quantity of 90 tabs each Rx.   This is consistent with her monthly quantity of 180 tabs of Tramadol.  Rx printed, signed and placed in TC basket for faxing.

## 2018-05-15 NOTE — TELEPHONE ENCOUNTER
Reason for Call:  Other prescription    Detailed comments: Faviola called again to follow up on her Tramadol request. States she has called 3 times, she is in pain and has not had medication or sleep since last night.  Asking for an urgent message be sent to Dr Russell.  Please call and let her know if and when this can be ready.   Thank you     Phone Number Patient can be reached at: Home number on file [phone] (home)    Best Time: Any    Can we leave a detailed message on this number? YES    Call taken on 5/15/2018 at 3:29 PM by Elder Brown

## 2018-05-15 NOTE — TELEPHONE ENCOUNTER
Message from Antonio:    We need a new prescription for traMADol (ULTRAM) 50 MG tablet since only 6 total refills are allowed per prescription and patient has been getting #90 at a time so has used all her refills.    Please send us over a new Rx for tramadol for the patient.  Thank you.    Requested Prescriptions   Pending Prescriptions Disp Refills     traMADol (ULTRAM) 50 MG tablet        Last Written Prescription Date:  03/01/18  Last Fill Quantity: 180,   # refills: 5  Last Office Visit: 03/01/18  Future Office visit:       Routing refill request to provider for review/approval because:  Drug not on the [facility], P or [facility] refill protocol or controlled substance 180 tablet 5     Sig: TAKE 1 TO 2 TABLETS BY MOUTH UP TO THREE TIMES DAILY AS NEEDED FOR PAIN    There is no refill protocol information for this order

## 2018-07-31 ENCOUNTER — TRANSFERRED RECORDS (OUTPATIENT)
Dept: HEALTH INFORMATION MANAGEMENT | Facility: CLINIC | Age: 68
End: 2018-07-31

## 2018-07-31 DIAGNOSIS — G89.4 CHRONIC PAIN SYNDROME: ICD-10-CM

## 2018-07-31 RX ORDER — TRAMADOL HYDROCHLORIDE 50 MG/1
TABLET ORAL
Qty: 90 TABLET | Refills: 1 | Status: SHIPPED | OUTPATIENT
Start: 2018-07-31 | End: 2018-08-28

## 2018-07-31 NOTE — TELEPHONE ENCOUNTER
Routing refill request to provider for review/approval because:  Drug not on the [facility] refill protocol   Guadalupe Duncan RN

## 2018-08-01 NOTE — TELEPHONE ENCOUNTER
Signed Prescriptions:                        Disp   Refills    traMADol (ULTRAM) 50 MG tablet             90 tab*1        Sig: TAKE 1 TO 2 TABLETS BY MOUTH UP THREE TIMES DAILY AS           NEEDED FOR PAIN  Authorizing Provider: KASSI TOMAS    Written rx faxed to the pharmacy, Pharmacy will contact pt when medication is ready for pickup.  Kelvin Witt,  [facility]

## 2018-08-28 DIAGNOSIS — G89.4 CHRONIC PAIN SYNDROME: ICD-10-CM

## 2018-08-28 NOTE — TELEPHONE ENCOUNTER
Requested Prescriptions   Pending Prescriptions Disp Refills     traMADol (ULTRAM) 50 MG tablet        Last Written Prescription Date:  07/31/18  Last Fill Quantity: 90,   # refills: 1  Last Office Visit: 03/01/18-Vocal  Future Office visit:       Routing refill request to provider for review/approval because:  Drug not on the [facility], P or [facility] refill protocol or controlled substance 90 tablet 1    There is no refill protocol information for this order

## 2018-08-29 RX ORDER — TRAMADOL HYDROCHLORIDE 50 MG/1
TABLET ORAL
Qty: 90 TABLET | Refills: 1 | Status: SHIPPED | OUTPATIENT
Start: 2018-08-29 | End: 2018-09-24

## 2018-09-24 DIAGNOSIS — G89.4 CHRONIC PAIN SYNDROME: ICD-10-CM

## 2018-09-24 NOTE — TELEPHONE ENCOUNTER
Requested Prescriptions   Pending Prescriptions Disp Refills     traMADol (ULTRAM) 50 MG tablet        Last Written Prescription Date:  08/29/18  Last Fill Quantity: 90,   # refills: 1  Last Office Visit: 03/01/18-Vocal  Future Office visit:       Routing refill request to provider for review/approval because:  Drug not on the [facility], P or [facility] refill protocol or controlled substance 90 tablet 1     Sig: TAKE 1 TO 2 TABLETS BY MOUTH UP THREE TIMES DAILY AS NEEDED FOR PAIN    There is no refill protocol information for this order

## 2018-09-25 RX ORDER — TRAMADOL HYDROCHLORIDE 50 MG/1
TABLET ORAL
Qty: 90 TABLET | Refills: 1 | Status: SHIPPED | OUTPATIENT
Start: 2018-09-28 | End: 2018-10-19

## 2018-09-26 NOTE — TELEPHONE ENCOUNTER
.Written rx faxed to the pharmacy, Pharmacy will contact pt when medication is ready for pickup.  Kelvin Witt,  [facility]

## 2018-10-19 DIAGNOSIS — G89.4 CHRONIC PAIN SYNDROME: ICD-10-CM

## 2018-10-19 RX ORDER — TRAMADOL HYDROCHLORIDE 50 MG/1
TABLET ORAL
Qty: 90 TABLET | Refills: 0 | Status: SHIPPED | OUTPATIENT
Start: 2018-10-19 | End: 2018-11-02

## 2018-10-19 NOTE — TELEPHONE ENCOUNTER
Written rx will be deliver to the  this afternoon for pickup after 1pm.  Kelvin Witt,  [facility]    Please call patient  to let them know the above info.  And remind the person who is picking up to bring their photo ID.  Kelvin Witt,  [facility]     NOTE: If patient/gaurdian calls the clinic before the care teams gets a chance to call them, please let pt know the above information regarding pickup times, remind them to bring a photo ID and close the encounter.  Kelvin Witt,  [facility]    FYI:  Anything completed after 2:00p will not be delivered until the next business day after 11a.   Kelvin Witt,  [facility].

## 2018-10-19 NOTE — TELEPHONE ENCOUNTER
Requested Prescriptions   Pending Prescriptions Disp Refills     traMADol (ULTRAM) 50 MG tablet 90 tablet 1     Sig: TAKE 1 TO 2 TABLETS BY MOUTH UP THREE TIMES DAILY AS NEEDED FOR PAIN    There is no refill protocol information for this order        Routing refill request to provider for review/approval because:  Drug not on the [facility] refill protocol           Hiren Cannon RN, BSN

## 2018-10-19 NOTE — TELEPHONE ENCOUNTER
Written rx retrieved from the  and faxed to the pharmacy, Pharmacy will contact pt when medication is ready for pickup.  Kelvin Witt,  [facility]

## 2018-11-02 DIAGNOSIS — G89.4 CHRONIC PAIN SYNDROME: ICD-10-CM

## 2018-11-02 RX ORDER — TRAMADOL HYDROCHLORIDE 50 MG/1
TABLET ORAL
Qty: 90 TABLET | Refills: 1 | Status: SHIPPED | OUTPATIENT
Start: 2018-11-02 | End: 2018-11-27

## 2018-11-02 NOTE — TELEPHONE ENCOUNTER
traMADol (ULTRAM) 50 MG tablet      Last Written Prescription Date:  10/19/18  Last Fill Quantity: 90,   # refills: 0  Last Office Visit: Vocal  Future Office visit:       Routing refill request to provider for review/approval because:  Drug not on the [facility], [facility] or [facility] refill protocol or controlled substance

## 2018-11-05 NOTE — TELEPHONE ENCOUNTER
Written rx faxed to the pharmacy on 11/2/18, Pharmacy will contact pt when medication is ready for pickup.  Kelvin Witt,  [facility]

## 2018-11-27 DIAGNOSIS — G89.4 CHRONIC PAIN SYNDROME: ICD-10-CM

## 2018-11-27 RX ORDER — TRAMADOL HYDROCHLORIDE 50 MG/1
TABLET ORAL
Qty: 90 TABLET | Refills: 1 | Status: SHIPPED | OUTPATIENT
Start: 2018-11-27 | End: 2018-12-26

## 2018-11-27 NOTE — TELEPHONE ENCOUNTER
Requested Prescriptions   Pending Prescriptions Disp Refills     traMADol (ULTRAM) 50 MG tablet    Last Written Prescription Date:  11/2/18  Last Fill Quantity: 90,  # refills: 1   Last Office Visit with [facility], [facility] or [facility] prescribing provider:  3/1/18   Future Office Visit:      90 tablet 1    There is no refill protocol information for this order              Masoud Faarax  [facility]

## 2018-12-24 ENCOUNTER — TELEPHONE (OUTPATIENT)
Dept: INTERNAL MEDICINE | Facility: CLINIC | Age: 68
End: 2018-12-24

## 2018-12-24 ENCOUNTER — NURSE TRIAGE (OUTPATIENT)
Dept: NURSING | Facility: CLINIC | Age: 68
End: 2018-12-24

## 2018-12-24 NOTE — TELEPHONE ENCOUNTER
Reason for call:  Per patient: needs a refill on tramadol    Phone number to reach patient:  Home number on file [phone] (home)    Best Time:  Anytime    Can we leave a detailed message on this number?  YES

## 2018-12-24 NOTE — TELEPHONE ENCOUNTER
Reason for call:  Pt  requesting Tramadol refill , FNA advised= Pt  will need a hard copy Rx and see a provider .   Recommendation / teaching ; FNA sent to  for appt for refill of Tramadol.      Caller Verbalizes understanding and denies further questions and will call back if further symptoms to triage or questions  .  Lupe Mcmanus RN  - [facility]

## 2018-12-25 ENCOUNTER — TELEPHONE (OUTPATIENT)
Dept: INTERNAL MEDICINE | Facility: CLINIC | Age: 68
End: 2018-12-25

## 2018-12-25 DIAGNOSIS — G89.4 CHRONIC PAIN SYNDROME: ICD-10-CM

## 2018-12-25 NOTE — TELEPHONE ENCOUNTER
Controlled Substance Refill Request for traMADol (ULTRAM) 50 MG tablet  Problem List Complete:  Yes   checked in past 3 months?  No, route to RN      Last Written Prescription Date:  11/27/18  Last Fill Quantity: 90,  # refills: 1   Last office visit: No previous visit found with prescribing provider:  3/1/18   Future Office Visit:      Associated Diagnoses     Chronic pain syndrome [G89.4]    Secondary to low back pain, S/P lumbar spinal fusion at L5-S1 last 2010.

## 2018-12-26 RX ORDER — TRAMADOL HYDROCHLORIDE 50 MG/1
TABLET ORAL
Qty: 90 TABLET | Refills: 1 | Status: SHIPPED | OUTPATIENT
Start: 2018-12-26 | End: 2019-01-22

## 2018-12-26 NOTE — TELEPHONE ENCOUNTER
See refill request from yesterday in Chart Review. Request has been forwarded on to PCP for review, RN's unable to refill per [facility] protocol.    Jenny Fraire RN  [facility]

## 2018-12-26 NOTE — TELEPHONE ENCOUNTER
Reason for Call:  Other prescription    Detailed comments: Needs Pain Med ASAP. Please call when approved. Sending High Priority Message    Phone Number Patient can be reached at: Home number on file [phone] (home)    Best Time: any    Can we leave a detailed message on this number? YES    Call taken on 12/26/2018 at 12:41 PM by Farrah Carlos

## 2018-12-26 NOTE — TELEPHONE ENCOUNTER
..Reason for Call:    checking on RX    Detailed comments: Patient visited EMERGENCY ROOM/[facility]    On 12-25 night; still is in a lot of pain;      Phone Number Patient can be reached at: Home number on file [phone] (home)    Best Time: anytime    Can we leave a detailed message on this number? YES    Call taken on 12/26/2018 at 10:14 AM by Radha Lux

## 2018-12-26 NOTE — TELEPHONE ENCOUNTER
Routing refill request to provider for review/approval because:  Drug not on the [facility] refill protocol     **Patient called clinic on 12/24/18 requesting refill. Went in to ED late last night for back pain and medication refill. Was not given any controlled substances upon discharge.    Jenny Fraire RN  [facility]      Per 12/24/18 telephone encounter:  Reason for call:  Per patient: needs a refill on tramadol     Phone number to reach patient:  Home number on file [phone] (home)     Best Time:  Anytime     Can we leave a detailed message on this number?  YES

## 2018-12-26 NOTE — TELEPHONE ENCOUNTER
Rx for tramadol was faxed to [facility] in [place]. Right way confirmed fax @ 2:41pm. I called pt and notified her of refill. SHAYY Chun

## 2019-01-22 DIAGNOSIS — G89.4 CHRONIC PAIN SYNDROME: ICD-10-CM

## 2019-01-22 RX ORDER — TRAMADOL HYDROCHLORIDE 50 MG/1
TABLET ORAL
Qty: 90 TABLET | Refills: 1 | Status: SHIPPED | OUTPATIENT
Start: 2019-01-22 | End: 2019-02-20

## 2019-01-22 NOTE — TELEPHONE ENCOUNTER
Requested Prescriptions   Pending Prescriptions Disp Refills     traMADol (ULTRAM) 50 MG tablet        Last Written Prescription Date:  12/26/18  Last Fill Quantity: 90,   # refills: 1  Last Office Visit: 03/01/18-Vocal  Future Office visit:       Routing refill request to provider for review/approval because:  Drug not on the [facility], P or [facility] refill protocol or controlled substance 90 tablet 1     Sig: TAKE 1 TO 2 TABLETS BY MOUTH UP TO THREE TIMES DAILY AS NEEDED FOR PAIN    There is no refill protocol information for this order

## 2019-02-20 DIAGNOSIS — G89.4 CHRONIC PAIN SYNDROME: ICD-10-CM

## 2019-02-20 RX ORDER — TRAMADOL HYDROCHLORIDE 50 MG/1
TABLET ORAL
Qty: 90 TABLET | Refills: 1 | Status: SHIPPED | OUTPATIENT
Start: 2019-02-20 | End: 2019-03-20

## 2019-02-20 NOTE — TELEPHONE ENCOUNTER
Requested Prescriptions   Pending Prescriptions Disp Refills     traMADol (ULTRAM) 50 MG tablet        Last Written Prescription Date:  01/22/19  Last Fill Quantity: 90,   # refills: 1  Last Office Visit: 03/01/18-Vocal  Future Office visit:       Routing refill request to provider for review/approval because:  Drug not on the [facility], P or [facility] refill protocol or controlled substance 90 tablet 1     Sig: TAKE 1 TO 2 TABLETS BY MOUTH UP TO THREE TIMES DAILY AS NEEDED FOR PAIN    There is no refill protocol information for this order

## 2019-02-20 NOTE — TELEPHONE ENCOUNTER
Faxed Rx for the Ultram, [facility], [phone], right fax confirmed at 2:35 pm today, 2/20/19.  Radha Dan MA/  [facility]

## 2019-03-19 DIAGNOSIS — G89.4 CHRONIC PAIN SYNDROME: ICD-10-CM

## 2019-03-19 NOTE — TELEPHONE ENCOUNTER
Requested Prescriptions   Pending Prescriptions Disp Refills     traMADol (ULTRAM) 50 MG tablet        Last Written Prescription Date:  02/20/19  Last Fill Quantity: 90,   # refills: 1  Last Office Visit: 03/01/18-vocal  Future Office visit:       Routing refill request to provider for review/approval because:  Drug not on the [facility], P or [facility] refill protocol or controlled substance 90 tablet 1     Sig: TAKE 1 TO 2 TABLETS BY MOUTH UP TO THREE TIMES DAILY AS NEEDED FOR PAIN    There is no refill protocol information for this order

## 2019-03-20 RX ORDER — TRAMADOL HYDROCHLORIDE 50 MG/1
TABLET ORAL
Qty: 90 TABLET | Refills: 5 | Status: SHIPPED | OUTPATIENT
Start: 2019-03-20 | End: 2019-06-06

## 2019-04-03 DIAGNOSIS — F32.1 MAJOR DEPRESSIVE DISORDER, SINGLE EPISODE, MODERATE (H): ICD-10-CM

## 2019-04-03 NOTE — TELEPHONE ENCOUNTER
"Requested Prescriptions   Pending Prescriptions Disp Refills     sertraline (ZOLOFT) 50 MG tablet [Pharmacy Med Name: SERTRALINE 50MG TABLETS] 90 tablet 0      Last Written Prescription Date:  03/01/18  Last Fill Quantity: 90,  # refills: 3   Last Office Visit with [facility], [facility] or [facility] prescribing provider:  03/01/18-Vocal   Future Office Visit:    Sig: TAKE 1 TABLET(50 MG) BY MOUTH DAILY    [facility] Protocol Failed - 4/3/2019 10:18 AM       Failed - PHQ-9 score less than 5 in past 6 months    Please review last PHQ-9 score.          Failed - Recent (6 mo) or future (30 days) visit within the authorizing provider's specialty    Patient had office visit in the last 6 months or has a visit in the next 30 days with authorizing provider or within the authorizing provider's specialty.  See \"Patient Info\" tab in inbasket, or \"Choose Columns\" in Meds & Orders section of the refill encounter.           Passed - Medication is active on med list       Passed - Patient is age 18 or older       Passed - No active pregnancy on record       Passed - No positive pregnancy test in last 12 months          "

## 2019-04-04 NOTE — TELEPHONE ENCOUNTER
Routing refill request to provider for review/approval because:  Labs not current:  PHQ-9  Patient needs to be seen because it has been more than 1 year since last office visit.    Ayaka Fontana RN

## 2019-06-06 ENCOUNTER — TELEPHONE (OUTPATIENT)
Dept: FAMILY MEDICINE | Facility: CLINIC | Age: 69
End: 2019-06-06

## 2019-06-06 DIAGNOSIS — G89.4 CHRONIC PAIN SYNDROME: ICD-10-CM

## 2019-06-10 RX ORDER — TRAMADOL HYDROCHLORIDE 50 MG/1
TABLET ORAL
Qty: 45 TABLET | Refills: 0 | Status: SHIPPED | OUTPATIENT
Start: 2019-06-10 | End: 2019-06-13

## 2019-06-10 NOTE — TELEPHONE ENCOUNTER
This writer attempted to contact patient on 06/10/19      Reason for call informed message below and left detailed message.      If patient calls back:   1st floor [facility] Care Team (MA/TC) called. Inform patient that someone from the team will contact them, document that pt called and route to care team.         Laya Berger MA

## 2019-06-13 ENCOUNTER — TELEPHONE (OUTPATIENT)
Dept: FAMILY MEDICINE | Facility: CLINIC | Age: 69
End: 2019-06-13

## 2019-06-13 DIAGNOSIS — G89.4 CHRONIC PAIN SYNDROME: ICD-10-CM

## 2019-06-14 RX ORDER — TRAMADOL HYDROCHLORIDE 50 MG/1
TABLET ORAL
Qty: 90 TABLET | Refills: 0 | Status: SHIPPED | OUTPATIENT
Start: 2019-06-14 | End: 2019-06-26

## 2019-06-14 NOTE — TELEPHONE ENCOUNTER
Routing refill request to provider for review/approval because:  Vandana given x1 and patient did not follow up, please advise  Drug not on the [facility] refill protocol         Hirne Cannon RN, BSN, PHN

## 2019-06-14 NOTE — TELEPHONE ENCOUNTER
Rx printed, signed and placed in TC basket for faxing.  Inform that no additional refills provided since she is due for an appointment.

## 2019-06-26 DIAGNOSIS — G89.4 CHRONIC PAIN SYNDROME: ICD-10-CM

## 2019-06-26 RX ORDER — TRAMADOL HYDROCHLORIDE 50 MG/1
TABLET ORAL
Qty: 90 TABLET | Refills: 0 | Status: SHIPPED | OUTPATIENT
Start: 2019-06-28 | End: 2019-07-10

## 2019-07-10 DIAGNOSIS — G89.4 CHRONIC PAIN SYNDROME: ICD-10-CM

## 2019-07-10 RX ORDER — TRAMADOL HYDROCHLORIDE 50 MG/1
TABLET ORAL
Qty: 90 TABLET | Refills: 1 | Status: SHIPPED | OUTPATIENT
Start: 2019-07-10 | End: 2019-08-05

## 2019-07-10 NOTE — TELEPHONE ENCOUNTER
Routing refill request to provider for review/approval because:  Drug not on the [facility] refill protocol   Patient needs to be seen because it has been more than 1 year since last office visit.        Hiren Cannon RN, BSN, PHN

## 2019-07-10 NOTE — TELEPHONE ENCOUNTER
Requested Prescriptions   Pending Prescriptions Disp Refills     traMADol (ULTRAM) 50 MG tablet [Pharmacy Med Name: TRAMADOL 50MG TABLETS]        Last Written Prescription Date:  06/28/19  Last Fill Quantity: 90,   # refills: 0  Last Office Visit: 03/01/18-Vocal  Future Office visit:       Routing refill request to provider for review/approval because:  Drug not on the [facility], P or [facility] refill protocol or controlled substance 90 tablet 0     Sig: TAKE 1 TO 2 TABLETS BY MOUTH UP TO THREE TIMES DAILY AS NEEDED FOR PAIN       There is no refill protocol information for this order

## 2019-08-05 DIAGNOSIS — G89.4 CHRONIC PAIN SYNDROME: ICD-10-CM

## 2019-08-05 NOTE — TELEPHONE ENCOUNTER
Requested Prescriptions   Pending Prescriptions Disp Refills     traMADol (ULTRAM) 50 MG tablet [Pharmacy Med Name: TRAMADOL 50MG TABLETS] 90 tablet 0     Sig: TAKE 1 TO 2 TABLETS BY MOUTH UP TO THREE TIMES DAILY AS NEEDED FOR PAIN       There is no refill protocol information for this order        Last Written Prescription Date:  7/10/19  Last Fill Quantity: 90,  # refills: 1   Last Office Visit with G, P or [facility] prescribing provider:  3/1/18   Future Office Visit:

## 2019-08-06 RX ORDER — TRAMADOL HYDROCHLORIDE 50 MG/1
TABLET ORAL
Qty: 90 TABLET | Refills: 1 | Status: SHIPPED | OUTPATIENT
Start: 2019-08-06 | End: 2019-08-29

## 2019-08-06 NOTE — TELEPHONE ENCOUNTER
Routing refill request to provider for review/approval because:  Drug not on the [facility] refill protocol         Hiren Cannon RN, BSN, PHN

## 2019-08-07 NOTE — TELEPHONE ENCOUNTER
Faxed Rx for the Ultram to [facility], [phone], right fax confirmed at 8:05 am today, 8/7/19.  Radha Dan MA/  [facility]

## 2019-08-29 DIAGNOSIS — G89.4 CHRONIC PAIN SYNDROME: ICD-10-CM

## 2019-08-29 NOTE — TELEPHONE ENCOUNTER
Requested Prescriptions   Pending Prescriptions Disp Refills     traMADol (ULTRAM) 50 MG tablet [Pharmacy Med Name: TRAMADOL 50MG TABLETS] 90 tablet 0     Sig: TAKE 1 TO 2 TABLETS BY MOUTH UP TO THREE TIMES DAILY AS NEEDED FOR PAIN       There is no refill protocol information for this order        Last Written Prescription Date:  8/6/19  Last Fill Quantity: 90,  # refills: 1   Last Office Visit with G, P or [facility] prescribing provider:  3/1/18   Future Office Visit:

## 2019-08-30 RX ORDER — TRAMADOL HYDROCHLORIDE 50 MG/1
TABLET ORAL
Qty: 90 TABLET | Refills: 1 | Status: SHIPPED | OUTPATIENT
Start: 2019-08-30 | End: 2019-09-27

## 2019-08-30 NOTE — TELEPHONE ENCOUNTER
Signed Prescriptions:                        Disp   Refills    traMADol (ULTRAM) 50 MG tablet             90 tab*1        Sig: TAKE 1 TO 2 TABLETS BY MOUTH UP TO THREE TIMES DAILY           AS NEEDED FOR PAIN  Authorizing Provider: KASSI TOMAS    Written rx faxed to the pharmacy, Pharmacy will contact pt when medication is ready for pickup.  Kelvin Witt,  [facility]

## 2019-08-31 ENCOUNTER — TELEPHONE (OUTPATIENT)
Dept: FAMILY MEDICINE | Facility: CLINIC | Age: 69
End: 2019-08-31

## 2019-08-31 NOTE — TELEPHONE ENCOUNTER
Plan does not cover this medication. Please call plan at [phone] to initiate prior authorization or call/fax pharmacy to change medication at  [phone]. Patient ID # is [id number].            [facility]

## 2019-09-06 NOTE — TELEPHONE ENCOUNTER
Pursue prior authorization for Tramadol.     Indication: chronic pain syndrome     Rationale: Patient has chronic low back pain despite previous lumbar spinal fusion (L5-S1) last 2010. NSAIDs, Tylenol and muscle relaxants are not effective.  Low back pain is comfortably managed with Tramadol for many years. Switching to other opiates is contraindicated due to higher risk of side effects.

## 2019-09-09 NOTE — TELEPHONE ENCOUNTER
Central Prior Authorization Team   Phone: [phone]      Prior Authorization Not Needed per Insurance    09/09/2019  Medication: traMADol (ULTRAM) 50 MG tablet - NOT NEEDED  Insurance Company: [employer] - Phone [phone] Fax [phone]  Expected CoPay:      Pharmacy Filling the Rx: [id number] - [place], [place] - [address]  Pharmacy Notified: Yes  Patient Notified: Yes    Patient picked up 7 day supply on 09/07/2019.

## 2019-11-08 ENCOUNTER — HEALTH MAINTENANCE LETTER (OUTPATIENT)
Age: 69
End: 2019-11-08

## 2019-11-20 DIAGNOSIS — G89.4 CHRONIC PAIN SYNDROME: ICD-10-CM

## 2019-11-20 RX ORDER — TRAMADOL HYDROCHLORIDE 50 MG/1
TABLET ORAL
Qty: 84 TABLET | Refills: 0 | Status: SHIPPED | OUTPATIENT
Start: 2019-11-20 | End: 2019-11-26

## 2019-11-20 NOTE — TELEPHONE ENCOUNTER
Requested Prescriptions   Pending Prescriptions Disp Refills     traMADol (ULTRAM) 50 MG tablet [Pharmacy Med Name: TRAMADOL 50MG TABLETS] 90 tablet 0     Sig: TAKE 1 TO 2 TABLETS BY MOUTH UP TO THREE TIMES DAILY AS NEEDED FOR PAIN       There is no refill protocol information for this order              Last Written Prescription Date:  10/23/19  Last Fill Quantity: 90,   # refills: 1  Last Office Visit: 3/1/18  Future Office visit:       Routing refill request to provider for review/approval because:  Drug not on the [facility], [facility] or [facility] refill protocol or controlled substance            Masoud Faarax  [facility]

## 2019-11-26 ENCOUNTER — OFFICE VISIT (OUTPATIENT)
Dept: FAMILY MEDICINE | Facility: CLINIC | Age: 69
End: 2019-11-26
Payer: COMMERCIAL

## 2019-11-26 ENCOUNTER — ANCILLARY PROCEDURE (OUTPATIENT)
Dept: GENERAL RADIOLOGY | Facility: CLINIC | Age: 69
End: 2019-11-26
Attending: INTERNAL MEDICINE
Payer: COMMERCIAL

## 2019-11-26 VITALS
OXYGEN SATURATION: 98 % | HEART RATE: 98 BPM | TEMPERATURE: 98.1 F | BODY MASS INDEX: 28.06 KG/M2 | HEIGHT: 65 IN | RESPIRATION RATE: 18 BRPM | DIASTOLIC BLOOD PRESSURE: 80 MMHG | SYSTOLIC BLOOD PRESSURE: 126 MMHG | WEIGHT: 168.4 LBS

## 2019-11-26 DIAGNOSIS — Z23 NEED FOR PROPHYLACTIC VACCINATION AND INOCULATION AGAINST INFLUENZA: ICD-10-CM

## 2019-11-26 DIAGNOSIS — F11.90 CHRONIC, CONTINUOUS USE OF OPIOIDS: ICD-10-CM

## 2019-11-26 DIAGNOSIS — M54.50 CHRONIC LEFT-SIDED LOW BACK PAIN WITHOUT SCIATICA: Primary | ICD-10-CM

## 2019-11-26 DIAGNOSIS — G89.29 CHRONIC LEFT-SIDED LOW BACK PAIN WITHOUT SCIATICA: Primary | ICD-10-CM

## 2019-11-26 DIAGNOSIS — F32.5 MAJOR DEPRESSION IN REMISSION (H): ICD-10-CM

## 2019-11-26 LAB
AMPHETAMINES UR QL: NOT DETECTED NG/ML
BARBITURATES UR QL SCN: NOT DETECTED NG/ML
BENZODIAZ UR QL SCN: NOT DETECTED NG/ML
BUPRENORPHINE UR QL: NOT DETECTED NG/ML
CANNABINOIDS UR QL: NOT DETECTED NG/ML
COCAINE UR QL SCN: NOT DETECTED NG/ML
D-METHAMPHET UR QL: NOT DETECTED NG/ML
METHADONE UR QL SCN: NOT DETECTED NG/ML
OPIATES UR QL SCN: NOT DETECTED NG/ML
OXYCODONE UR QL SCN: NOT DETECTED NG/ML
PCP UR QL SCN: NOT DETECTED NG/ML
PROPOXYPH UR QL: NOT DETECTED NG/ML
TRICYCLICS UR QL SCN: NOT DETECTED NG/ML

## 2019-11-26 PROCEDURE — 72100 X-RAY EXAM L-S SPINE 2/3 VWS: CPT

## 2019-11-26 PROCEDURE — 99214 OFFICE O/P EST MOD 30 MIN: CPT | Mod: 25 | Performed by: INTERNAL MEDICINE

## 2019-11-26 PROCEDURE — 80306 DRUG TEST PRSMV INSTRMNT: CPT | Performed by: INTERNAL MEDICINE

## 2019-11-26 PROCEDURE — G0008 ADMIN INFLUENZA VIRUS VAC: HCPCS | Performed by: INTERNAL MEDICINE

## 2019-11-26 PROCEDURE — 90662 IIV NO PRSV INCREASED AG IM: CPT | Performed by: INTERNAL MEDICINE

## 2019-11-26 RX ORDER — TRAMADOL HYDROCHLORIDE 50 MG/1
50-100 TABLET ORAL 3 TIMES DAILY PRN
Qty: 90 TABLET | Refills: 1 | Status: SHIPPED | OUTPATIENT
Start: 2019-12-04 | End: 2020-01-05

## 2019-11-26 ASSESSMENT — ANXIETY QUESTIONNAIRES
7. FEELING AFRAID AS IF SOMETHING AWFUL MIGHT HAPPEN: NOT AT ALL
IF YOU CHECKED OFF ANY PROBLEMS ON THIS QUESTIONNAIRE, HOW DIFFICULT HAVE THESE PROBLEMS MADE IT FOR YOU TO DO YOUR WORK, TAKE CARE OF THINGS AT HOME, OR GET ALONG WITH OTHER PEOPLE: NOT DIFFICULT AT ALL
2. NOT BEING ABLE TO STOP OR CONTROL WORRYING: NOT AT ALL
1. FEELING NERVOUS, ANXIOUS, OR ON EDGE: NOT AT ALL
5. BEING SO RESTLESS THAT IT IS HARD TO SIT STILL: NOT AT ALL
6. BECOMING EASILY ANNOYED OR IRRITABLE: NOT AT ALL
GAD7 TOTAL SCORE: 0
3. WORRYING TOO MUCH ABOUT DIFFERENT THINGS: NOT AT ALL

## 2019-11-26 ASSESSMENT — PATIENT HEALTH QUESTIONNAIRE - PHQ9
SUM OF ALL RESPONSES TO PHQ QUESTIONS 1-9: 2
5. POOR APPETITE OR OVEREATING: NOT AT ALL

## 2019-11-26 ASSESSMENT — PAIN SCALES - GENERAL: PAINLEVEL: MILD PAIN (2)

## 2019-11-26 ASSESSMENT — MIFFLIN-ST. JEOR: SCORE: 1289.74

## 2019-11-26 NOTE — LETTER
[facility]  11/26/19    Patient: Faviola Bolivar  YOB: 1950  Medical Record Number: [id number]                                                                  Opioid / Opioid Plus Controlled Substance Agreement    I understand that my care provider has prescribed an opioid (narcotic) controlled substance to help manage my condition(s). I am taking this medicine to help me function or work. I know this is strong medicine, and that it can cause serious side effects. Opioid medicine can be sedating, addicting and may cause a dependency on the drug. They can affect my ability to drive or think, and cause depression. They need to be taken exactly as prescribed. Combining opioids with certain medicines or chemicals (such as cocaine, sedatives and tranquilizers, sleeping pills, meth) can be dangerous or even fatal. Also, if I stop opioids suddenly, I may have severe withdrawal symptoms. Last, I understand that opioids do not work for all types of pain nor for all patients. If not helpful, I may be asked to stop them.        The risks, benefits, and side effects of these medicine(s) were explained to me. I agree that:    1. I will take part in other treatments as advised by my care team. This may be psychiatry or counseling, physical therapy, behavioral therapy, group treatment or a referral to a pain clinic. I will reduce or stop my medicine when my care team tells me to do so.  2. I will take my medicines as prescribed. I will not change the dose or schedule unless my care team tells me to. There will be no refills if I  run out early.   I may be contactedwithout warning and asked to complete a urine drug test or pill count at any time.   3. I will keep all my appointments, and understand this is part of the monitoring of opioids. My care team may require an office visit for EVERY opioid/controlled substance refill. If I miss appointments or don t follow instructions, my care team may stop  my medicine.  4. I will not ask other providers to prescribe controlled substances, and I will not accept controlled substances from other people. If I need another prescribed controlled substance for a new reason, I will tell my care team within 1 business day.  5. I will use one pharmacy to fill all of my controlled substance prescriptions, and it is up to me to make sure that I do not run out of my medicines on weekends or holidays. If my care team is willing to refill my opioid prescription without a visit, I must request refills only during office hours, refills may take up to 3 days to process, and it may take up to 5 to 7 days for my medicine to be mailed and ready at my pharmacy. Prescriptions will not be mailed anywhere except my pharmacy.        [id number]  Rev 12/18         Registration to scan to EHR                             Page 1 of 2               Controlled Substance Agreement Opioid        [facility]  11/26/19  Patient: Faviola Bolivar  YOB: 1950  Medical Record Number: [id number]                                                                  6. I am responsible for my prescriptions. If the medicine/prescription is lost or stolen, it will not be replaced. I also agree not to share controlled substance medicines with anyone.  7. I agree to not use ANY illegal or recreational drugs. This includes marijuana, cocaine, bath salts or other drugs. I agree not to use alcohol unless my care team says I may.          I agree to give urine samples whenever asked. If I don t give a urine sample, the care team may stop my medicine.    8. If I enroll in the [place] Medical Marijuana program, I will tell my care team. I will also sign an agreement to share my medical records with my care team.   9. I will bring in my list of medicines (or my medicine bottles) each time I come to the clinic.   10. I will tell my care team right away if I become pregnant or have a new medical problem  treated outside of my regular clinic.  11. I understand that this medicine can affect my thinking and judgment. It may be unsafe for me to drive, use machinery and do dangerous tasks. I will not do any of these things until I know how the medicine affects me. If my dose changes, I will wait to see how it affects me. I will contact my care team if I have concerns about medicine side effects.    I understand that if I do not follow any of the conditions above, my prescriptions or treatment may be stopped.      I agree that my provider, clinic care team, and pharmacy may work with any city, state or federal law enforcement agency that investigates the misuse, sale, or other diversion of my controlled medicine. I will allow my provider to discuss my care with or share a copy of this agreement with any other treating provider, pharmacy or emergency room where I receive care. I agree to give up (waive) any right of privacy or confidentiality with respect to these consents.     I have read this agreement and have asked questions about anything I did not understand.      ________________________________________________________________________  Patient signature - Date/Time -  Faviola Bolivar                                      ________________________________________________________________________  Witness signature                                                            ________________________________________________________________________  Provider signature - Lazarus Russell MD      [id number]  Rev 12/18         Registration to scan to EHR                         Page 2 of 2                   Controlled Substance Agreement Opioid           Page 1 of 2  Opioid Pain Medicines (also known as Narcotics)  What You Need to Know    What are opioids?   Opioids are pain medicines that must be prescribed by a doctor.  They are also known as narcotics.    Examples are:     morphine (MS Contin, Zoraida)    oxycodone  (Oxycontin)    oxycodone and acetaminophen (Percocet)    hydrocodone and acetaminophen (Vicodin, Norco)     fentanyl patch (Duragesic)     hydromorphone (Dilaudid)     methadone     What do opioids do well?   Opioids are best for short-term pain after a surgery or injury. They also work well for cancer pain. Unlike other pain medicines, they do not cause liver or kidney failure or ulcers. They may help some people with long-lasting (chronic) pain.     What do opioids NOT do well?   Opioids never get rid of pain entirely, and they do not work well for most patients with chronic pain. Opioids do not reduce swelling, one of the causes of pain. They also don t work well for nerve pain.                           For informational purposes only.  Not to replace the advice of your care provider.  Copyright [address] [facility]. All right reserved. [employer] 359366-[id number] 02/18.      Page 2 of 2    Risks and side effects   Talk to your doctor before you start or decide to keep taking one of these medicines. Side effects include:    Lowering your breathing rate enough to cause death    Overdose, including death, especially if taking higher than prescribed doses    Long-term opioid use    Worse depression symptoms; less pleasure in things you usually enjoy    Feeling tired or sluggish    Slower thoughts or cloudy thinking    Being more sensitive to pain over time; pain is harder to control    Trouble sleeping or restless sleep    Changes in hormone levels (for example, less testosterone)    Changes in sex drive or ability to have sex    Constipation    Unsafe driving    Itching and sweating    Feeling dizzy    Nausea, vomiting and dry mouth    What else should I know about opioids?  When someone takes opioids for too long or too often, they become dependent. This means that if you stop or reduce the medicine too quickly, you will have withdrawal symptoms.    Dependence is not the same as addiction. Addiction is when  people keep using a substance that harms their body, their mind or their relations with others. If you have a history of drug or alcohol abuse, taking opioids can cause a relapse.    Over time, opioids don t work as well. Most people will need higher and higher doses. The higher the dose, the more serious the side effects. We don t know the long-term effects of opioids.      Prescribed opioids aren't the best way to manage chronic pain    Other ways to manage pain include:      Ibuprofen or acetaminophen.  You should always try this first.      Treat health problems that may be causing pain.      acupuncture or massage, deep breathing, meditation, visual imagery, aromatherapy.      Use heat or ice at the pain site      Physical therapy and exercise      Stop smoking      See a counselor or therapist                                                  People who have used opioids for a long time may have a lower quality of life, worse depression, higher levels of pain and more visits to doctors.    Never share your opioids with others. Be sure to store opioids in a secure place, locked if possible.Young children can easily swallow them and overdose.     You can overdose on opioids.  Signs of overdose include decrease or loss of consciousness, slowed breathing, trouble waking and blue lips.  If someone is worried about overdose, they should call 911.    If you are at risk for overdose, you may get naloxone (Narcan, a medicine that reverses the effects of opioids.  If you overdose, a friend or family member can give you Narcan while waiting for the ambulance.  They need to know the signs of overdose and how to give Narcan.    While you're taking opioids:    Don't use alcohol or street drugs. Taking them together can cause death.    Don't take any of these medicines unless your doctor says its okay.  Taking these with opioids can cause death.    Benzodiazepines (such as lorazepam         or diazepam)    Muscle relaxers  (such as cyclobenzaprine)    sleeping pills    other opioids    Safe disposal of opioids  Find your area drug take-back program, your pharmacy mail-back program, buy a special disposal bag (such as Deterra) from your pharmacy or flush them down the toilet.  Use the guidelines at:  www.fda.gov/drugs/resourcesforyou

## 2019-11-26 NOTE — PROGRESS NOTES
Subjective     Faviola Bolivar is a 69 year old female who presents to clinic today for the following health issues:    HPI   Chronic Pain Follow-Up       Type / Location of Pain: Back pain, originates at left side to right side. Does not radiate to legs.  Analgesia/pain control:       Recent changes:  same      Overall control: Tolerable with discomfort  Activity level/function:      Daily activities:  Able to do all daily activities    Work:  not applicable  Adverse effects:  No  Adherance    Taking medication as directed?  Yes    Participating in other treatments: no. S/P lumbar fusion at lower lumbar area.   Risk Factors:    Sleep:  Good    Mood/anxiety:  controlled    Recent family or social stressors:  none noted    Other aggravating factors: none  PHQ-9 SCORE 11/3/2015 9/6/2016 3/1/2018   PHQ-9 Total Score - - -   PHQ-9 Total Score 2 1 2     ASHLEE-7 SCORE 8/27/2015 9/6/2016   Total Score 1 0     Encounter-Level CSA - 03/01/2018:    Controlled Substance Agreement - Scan on 3/19/2018 10:03 AM: CONTROLLED SUBSTANCE AGREEMENT     Encounter-Level CSA - 11/03/2015:    Controlled Substance Agreement - Scan on 11/9/2015  5:39 PM: CONTROLLED SUBSTANCE AGREEMENT     Patient-Level CSA:    There are no patient-level csa.         How many servings of fruits and vegetables do you eat daily?  2-3    On average, how many sweetened beverages do you drink each day (Examples: soda, juice, sweet tea, etc.  Do NOT count diet or artificially sweetened beverages)?   4    How many days per week do you miss taking your medication? 0           D.I.R.E Score: Patient Selection for Chronic Opioid Analgesia    For each factor, rate the patient's score from 1 - 3 based on the explanations on the right.       Diagnosis             2         1 = Benign chronic condition with minimal objective findings or no definite medical diagnosis.  Examples:  fibromyalgia, migraine, headaches, non-specific back pain.  2 = Slowly progressive condition  concordant with moderate pain, or fixed condition with moderate objective findings.  Examples: failed back surgery syndrome, back pain with moderate degenerative changes, neuropathic pain.  3 = Advanced condition concordant with severe pain with objective findings.  Examples: severe ischemic vascular disease, advanced neuropathy, severe spinal stenosis.    Intractability             1         1 = Few therapies have been tried and the patient takes a passive role in his/her pain management process.   2 = Most costomary treatments have been tried but the patient is not fully engaged in the pain management process, or barriers prevent (insurance, transportation, medical illness)  3 = Patient fully engaged in a spectrum of appropriate treatments but with inadequate response.    Risk   (Risk = Total of P+C+R+S below)       Psychological             3         1 = Serious personality dysfunction or mental illness interfering with care.  Examples: personality disorder, severe affective disorder, significant personality issues.  2 = Personality or mental health interferes moderately.  Example: depression or anxiety disorder.  3 = Good communication with the clinic.  No significant personality dysfunction or mental illness.       Chemical      Health             3         1 = Active or very recent use of illicit drugs, excessive alcohol, or prescription drug abuse.  2 = Chemical coper (uses medications to cope with stress) or history of chemical dependency in remission.  3 = No CD history.  Not drug-focused or chemically reliant       Reliability             3         1 = History of numerous problems: medication misuse, missed appointments, rarely follows through.  2 = Occasional difficulties with compliance, but generally reliable.  3 = Highly reliable patient with medications, appointments and treatment.       Social      Support             3         1= Life in chaos.  Little family support and few close relationships.   Loss of most normal life roles.  2 = Reduction in some relationships and life roles.  3 = Supportive family/close relationships.  Involved in work or school and no social isolation.    Efficacy score             2         1 = Poor function or minimal pain relief despite moderate to high doses.  2 = Moderate benefit with function improved in a number of ways (or insufficient info - hasn't tried opioid yet or very low doses or too short a trial.  3 = Good improvement in pain and function and quality of life with stable doses over time.                                    15    Total score = D + I + R + E    Score 7-13: Not a suitable candidate for long-term opioid analgesia  Score 14-21: May be a good candidate for long-term opioid analgesia    Copyright 2013 Babak Flynn MD, The DIRE Score: Predicting Outcomes of Opioid Prescribing for Chronic Pain. The Journal of Pain. 7(9) (September), 2006:671-681    Depression Followup    How are you doing with your depression since your last visit? Improved     Are you having other symptoms that might be associated with depression? No    Have you had a significant life event?  No     Are you feeling anxious or having panic attacks?   No    Do you have any concerns with your use of alcohol or other drugs? No      Social History     Tobacco Use     Smoking status: Never Smoker     Smokeless tobacco: Never Used   Substance Use Topics     Alcohol use: Yes     Comment: rarely     Drug use: No     PHQ 11/3/2015 9/6/2016 3/1/2018   PHQ-9 Total Score 2 1 2   Q9: Thoughts of better off dead/self-harm past 2 weeks Not at all Not at all Not at all     ASHLEE-7 SCORE 8/27/2015 9/6/2016   Total Score 1 0     Last PHQ-9 11/26/2019   1.  Little interest or pleasure in doing things 0   2.  Feeling down, depressed, or hopeless 0   3.  Trouble falling or staying asleep, or sleeping too much 1   4.  Feeling tired or having little energy 1   5.  Poor appetite or overeating 0   6.  Feeling bad about  yourself 0   7.  Trouble concentrating 0   8.  Moving slowly or restless 0   Q9: Thoughts of better off dead/self-harm past 2 weeks 0   PHQ-9 Total Score 2   Difficulty at work, home, or with people Not difficult at all     ASHLEE-7  11/26/2019   1. Feeling nervous, anxious, or on edge 0   2. Not being able to stop or control worrying 0   3. Worrying too much about different things 0   4. Trouble relaxing 0   5. Being so restless that it is hard to sit still 0   6. Becoming easily annoyed or irritable 0   7. Feeling afraid, as if something awful might happen 0   ASHLEE-7 Total Score 0   If you checked any problems, how difficult have they made it for you to do your work, take care of things at home, or get along with other people? Not difficult at all       Patient Active Problem List   Diagnosis     Restless legs     Osteopenia     Fibromyalgia syndrome     Moderate major depression (H)     Restless leg syndrome     Hyperlipidemia LDL goal <130     Advanced directives, counseling/discussion     Overweight (BMI 25.0-29.9)     Vitamin D deficiency     Diverticulosis of large intestine     Primary osteoarthritis of left knee     DDD (degenerative disc disease), lumbar     Chronic, continuous use of opioids     Chronic left-sided low back pain without sciatica     Major depression in remission (H)     Past Surgical History:   Procedure Laterality Date     APPENDECTOMY       ARTHROSCOPY KNEE RT/LT  2007    left      BACK SURGERY  2010    lumbar fusion     C NONSPECIFIC PROCEDURE  2002     COLONOSCOPY N/A 10/12/2015    Procedure: COLONOSCOPY;  Surgeon: Emeka Tejeda MD;  Location: [facility]     COLONOSCOPY WITH CO2 INSUFFLATION N/A 10/12/2015    Procedure: COLONOSCOPY WITH CO2 INSUFFLATION;  Surgeon: Emeka Tejeda MD;  Location: [facility] OR     D & C       HC REMOVAL GALLBLADDER         Social History     Tobacco Use     Smoking status: Never Smoker     Smokeless tobacco: Never Used   Substance Use Topics     Alcohol  use: Yes     Comment: rarely     Family History   Problem Relation Age of Onset     Breast Cancer Mother      Cancer Mother         cervical         Current Outpatient Medications   Medication Sig Dispense Refill     sertraline (ZOLOFT) 50 MG tablet TAKE 1 TABLET(50 MG) BY MOUTH DAILY 90 tablet 3     traMADol (ULTRAM) 50 MG tablet Take 1-2 tablets ( mg) by mouth 3 times daily as needed for severe pain 90 tablet 1     Allergies   Allergen Reactions     Nka [No Known Allergies]      Recent Labs   Lab Test 08/27/15  1412   LDL 79   HDL 49*   TRIG 313*   ALT 26   CR 0.64   GFRESTIMATED >90  Non  GFR Calc     GFRESTBLACK >90   GFR Calc     POTASSIUM 4.0   TSH 1.08      BP Readings from Last 3 Encounters:   11/26/19 126/80   03/01/18 135/80   11/05/17 144/75    Wt Readings from Last 3 Encounters:   11/26/19 76.4 kg (168 lb 6.4 oz)   03/01/18 77.1 kg (170 lb)   11/05/17 77.6 kg (171 lb)                      Reviewed and updated as needed this visit by Provider         Review of Systems   ROS COMP: CONSTITUTIONAL: NEGATIVE for fever, chills, change in weight  INTEGUMENTARY/SKIN: NEGATIVE for worrisome rashes, moles or lesions  EYES: NEGATIVE for vision changes or irritation  ENT/MOUTH: NEGATIVE for ear, mouth and throat problems  RESP: NEGATIVE for significant cough or SOB  BREAST: NEGATIVE for masses, tenderness or discharge  CV: NEGATIVE for chest pain, palpitations or peripheral edema  GI: NEGATIVE for nausea, abdominal pain, heartburn, or change in bowel habits  : NEGATIVE for frequency, dysuria, or hematuria  MUSCULOSKELETAL:As above.  NEURO: NEGATIVE for weakness, dizziness or paresthesias  ENDOCRINE: NEGATIVE for temperature intolerance, skin/hair changes  HEME: NEGATIVE for bleeding problems  PSYCHIATRIC: NEGATIVE for changes in mood or affect      Objective    /80 (BP Location: Right arm, Patient Position: Sitting, Cuff Size: Adult Large)   Pulse 98   Temp 98.1  F  "(36.7  C) (Oral)   Resp 18   Ht 1.651 m (5' 5\")   Wt 76.4 kg (168 lb 6.4 oz)   LMP  (LMP Unknown)   SpO2 98%   Breastfeeding No   BMI 28.02 kg/m    Physical Exam   GENERAL: healthy, alert and no distress  EYES: Eyes grossly normal to inspection and conjunctivae and sclerae normal  HENT: normal cephalic/atraumatic and oral mucous membranes moist  RESP: lungs clear to auscultation - no rales, rhonchi or wheezes  CV: regular rate and rhythm, normal S1 S2, no S3 or S4, no murmur, click or rub, no peripheral edema and peripheral pulses strong  MS: no gross musculoskeletal defects noted, no edema  SKIN: no suspicious lesions or rashes  NEURO: Normal strength and tone, sensory exam grossly normal and mentation intact  Comprehensive back pain exam:  Tenderness of left lower lumbar spine., Range of motion not limited by pain, Lower extremity strength functional and equal on both sides, Lower extremity reflexes within normal limits bilaterally, Lower extremity sensation normal and equal on both sides and Straight leg raise negative bilaterally  PSYCH: mentation appears normal, affect normal/bright    Diagnostic Test Results:  MR LUMBAR SPINE1/18/2006  [facility]  Result Narrative   MRI LUMBAR SPINE, 1/18/06    TECHNIQUE: T1 AND T2 WEIGHTED SAGITTAL IMAGES AND DOUBLE ECHO T2 AND  PROTON DENSITY WEIGHTED AXIAL IMAGES WERE OBTAINED OF THE LUMBAR SPINE.    COMPARISON: NONE.    INDICATION: LEFT HIP PAIN.    FINDINGS: FOR PURPOSES OF THIS EXAMINATION, FIVE LUMBAR-TYPE VERTEBRAL  BODIES ARE IDENTIFIED.  THERE IS 2-3 MM POSTERIOR SUBLUXATION L5 UPON  S1.  MULTILEVEL MILD DEGENERATIVE LUMBAR SPONDYLOSIS WITH ADVANCED  DEGENERATIVE DISC NARROWING INCLUDING LOSS OF DISC HEIGHT AT L5-S1.  ENDPLATE OSTEOPHYTES WITH FACET JOINT SPONDYLOSIS LOWER LUMBAR REGION.  DEGENERATIVE ENDPLATE SIGNAL ABNORMALITY ABOUT THE NARROWED L5-S1  INTERSPACE.  NORMAL APPEARANCE TO THE CONUS MEDULLARIS AND ROOTS OF " THE  CAUDA EQUINA.  NO MARROW/LIGAMENTOUS EDEMA.  TINY SIMPLE CYST LOWER POLE  OF THE RIGHT KIDNEY.    L1-2: NO SPINAL CANAL OR NEURAL FORAMINAL COMPROMISE.    L2-3: MILD ANNULAR BULGING WITHOUT SPINAL CANAL OR NEURAL FORAMINAL  COMPROMISE.    L3-4: DISC DESICCATION WITHOUT SPINAL CANAL OR NEURAL FORAMINAL  NARROWING.    L4-5: GENERALIZED DISC BULGE.  FLATTENING OF THE VENTRAL THECAL SAC.  NO  SPINAL CANAL COMPROMISE.  FACET JOINT SPONDYLOSIS.  MILD BILATERAL  NEURAL FORAMINAL STENOSIS.    L5-S1: LOW GRADE POSTERIOR SUBLUXATION L5 UPON S1.  SUBLIGAMENTOUS  UNCOVERING OF THE DISC MATERIAL INFERIORLY WITH SUPERIMPOSED BROAD BASED  CENTRAL DISC PROTRUSION RESULTS IN MILD SPINAL CANAL COMPROMISE.  MODERATE BILATERAL NEURAL FORAMINAL STENOSIS, RIGHT MORE THAN LEFT,  INCLUDING MILD MASS EFFECT UPON THE EXITING L5 NERVE ROOT, RIGHT MORE  THAN LEFT, COULD RESULT IN MIXED UNILATERAL OR BILATERAL L5  RADICULOPATHY.    IMPRESSION: DEGENERATIVE POSTERIOR SUBLUXATION L5 UPON S1 WITH SPINAL  CANAL AND NEURAL FORAMINAL NARROWING AT THIS LEVEL.  THIS COULD RESULT  IN MIXED UNILATERAL OR BILATERAL L5 RADICULOPATHY.  SEE ABOVE FOR  DETAILS.             Assessment & Plan     1. Chronic left-sided low back pain without sciatica  Secondary to left L5-S1 facet arthropathy. Consider facet injections if condition worsens.  - traMADol (ULTRAM) 50 MG tablet; Take 1-2 tablets ( mg) by mouth 3 times daily as needed for severe pain  Dispense: 90 tablet; Refill: 1  - XR Lumbar Spine 2/3 Views    2. Major depression in remission (H)  Stable with current regimen.  - sertraline (ZOLOFT) 50 MG tablet; TAKE 1 TABLET(50 MG) BY MOUTH DAILY  Dispense: 90 tablet; Refill: 3    3. Chronic, continuous use of opioids  Not responsive to NSAIDs and muscle relaxants.  - traMADol (ULTRAM) 50 MG tablet; Take 1-2 tablets ( mg) by mouth 3 times daily as needed for severe pain  Dispense: 90 tablet; Refill: 1  - Drug Abuse Screen Panel 13, Urine (Pain  "Care Package)  - XR Lumbar Spine 2/3 Views    4. Need for prophylactic vaccination and inoculation against influenza  No absolute contraindications.  - VACCINE ADMINISTRATION, INITIAL  - INFLUENZA (HIGH DOSE) 3 VALENT VACCINE [59946]     BMI:   Estimated body mass index is 28.02 kg/m  as calculated from the following:    Height as of this encounter: 1.651 m (5' 5\").    Weight as of this encounter: 76.4 kg (168 lb 6.4 oz).   Weight management plan: diet and exercise.      Return in about 1 year (around 11/26/2020) for Routine Visit.    Lazarus Russell MD  [facility]      "

## 2019-11-26 NOTE — TELEPHONE ENCOUNTER
Patient is scheduled for an appointment today.  Kelvin Witt,  For 1st Floor Primary Care ([facility])

## 2019-11-27 ASSESSMENT — ANXIETY QUESTIONNAIRES: GAD7 TOTAL SCORE: 0

## 2019-12-04 PROBLEM — F32.5 MAJOR DEPRESSION IN REMISSION (H): Status: ACTIVE | Noted: 2019-12-04

## 2019-12-26 ENCOUNTER — OFFICE VISIT (OUTPATIENT)
Dept: OPHTHALMOLOGY | Facility: CLINIC | Age: 69
End: 2019-12-26
Payer: COMMERCIAL

## 2019-12-26 DIAGNOSIS — B02.30 HERPES ZOSTER OPHTHALMICUS: Primary | ICD-10-CM

## 2019-12-26 PROCEDURE — 92002 INTRM OPH EXAM NEW PATIENT: CPT | Performed by: OPHTHALMOLOGY

## 2019-12-26 RX ORDER — ACYCLOVIR 800 MG/1
800 TABLET ORAL
Qty: 35 TABLET | Refills: 1 | Status: SHIPPED | OUTPATIENT
Start: 2019-12-26 | End: 2020-11-02

## 2019-12-26 ASSESSMENT — VISUAL ACUITY
METHOD: SNELLEN - LINEAR
OS_SC: 20/30
OD_SC: 20/30
OS_SC+: -1

## 2019-12-26 ASSESSMENT — EXTERNAL EXAM - LEFT EYE: OS_EXAM: NORMAL

## 2019-12-26 ASSESSMENT — SLIT LAMP EXAM - LIDS: COMMENTS: 2+ DERMATOCHALASIS, 1+ MEIBOMIAN GLAND DYSFUNCTION

## 2019-12-26 NOTE — PATIENT INSTRUCTIONS
Start Acyclovir by mouth 800 mg five times daily.  Continue using artificial tears both eyes up to four times daily as needed.  Return visit in 2 weeks for MD check.     Ismael Grewal M.D.  [phone]

## 2019-12-26 NOTE — PROGRESS NOTES
Current Eye Medications:  Refresh drops, hot and cold packs.       Subjective:  Patient was seen in the ER on 12-21-19, and diagnosed with Zoster on the right side of her face.  Rash started on the 19th and she had a headache for one week prior to the ER visit.  She was prescribed Valtrex, but she hasn't picked it up (she was told it was $400).  Right eye has been red, and her eyelids swell and close shut.  She has been nauseas.  She has bifocal glasses, but hasn't been wearing them secondary to the rash on her nose.       Objective:  See Ophthalmology Exam.       Assessment:  Right Herpes zoster ophthalmicus with mild keratitis.      Plan:  Start Acyclovir by mouth 800 mg five times daily.  Continue using artificial tears both eyes up to four times daily as needed.  Return visit in 2 weeks for MD check.     Ismael Grewal M.D.  [phone]

## 2019-12-27 PROBLEM — B02.30 HERPES ZOSTER OPHTHALMICUS: Status: ACTIVE | Noted: 2019-12-27

## 2020-01-02 DIAGNOSIS — G89.29 CHRONIC LEFT-SIDED LOW BACK PAIN WITHOUT SCIATICA: ICD-10-CM

## 2020-01-02 DIAGNOSIS — F11.90 CHRONIC, CONTINUOUS USE OF OPIOIDS: ICD-10-CM

## 2020-01-02 DIAGNOSIS — M54.50 CHRONIC LEFT-SIDED LOW BACK PAIN WITHOUT SCIATICA: ICD-10-CM

## 2020-01-02 NOTE — TELEPHONE ENCOUNTER
Requested Prescriptions   Pending Prescriptions Disp Refills     traMADol (ULTRAM) 50 MG tablet [Pharmacy Med Name: TRAMADOL 50MG TABLETS]        Last Written Prescription Date:  12/04/19  Last Fill Quantity: 90,   # refills: 1  Last Office Visit: 11/26/19-Vocal  Future Office visit:    Next 5 appointments (look out 90 days)    Jan 09, 2020 10:15 AM CST  Return Visit with Ismael Grewal MD  [facility] ([address] [address]  [phone]           Routing refill request to provider for review/approval because:  Drug not on the [facility], [facility] or  Health refill protocol or controlled substance 90 tablet      Sig: TAKE 1 TO 2 TABLETS BY MOUTH THREE TIMES DAILY AS NEEDED FOR SEVERE PAIN       There is no refill protocol information for this order

## 2020-01-05 RX ORDER — TRAMADOL HYDROCHLORIDE 50 MG/1
TABLET ORAL
Qty: 90 TABLET | Refills: 1 | Status: SHIPPED | OUTPATIENT
Start: 2020-01-05 | End: 2020-01-06

## 2020-01-05 NOTE — TELEPHONE ENCOUNTER
Routing refill request to provider for review/approval because:  Drug not on the [facility] refill protocol   Jessika Walton RN  [facility]

## 2020-01-06 ENCOUNTER — TELEPHONE (OUTPATIENT)
Dept: OPHTHALMOLOGY | Facility: CLINIC | Age: 70
End: 2020-01-06

## 2020-01-06 ENCOUNTER — TELEPHONE (OUTPATIENT)
Dept: FAMILY MEDICINE | Facility: CLINIC | Age: 70
End: 2020-01-06

## 2020-01-06 RX ORDER — TRAMADOL HYDROCHLORIDE 50 MG/1
TABLET ORAL
Qty: 90 TABLET | Refills: 1 | Status: SHIPPED | OUTPATIENT
Start: 2020-01-06 | End: 2020-02-04

## 2020-01-06 NOTE — TELEPHONE ENCOUNTER
Patient had called into her Pharmacy to get traMADol (ULTRAM) 50 MG tablet refilled but the [facility] said that Dr. Russell will not refill the prescription. Patient is having a lot of back pain and needs medication.     Please call [phone]    Okay to call anytime and leave a message.    Thank you.

## 2020-01-06 NOTE — TELEPHONE ENCOUNTER
Order Information     Ordered Status Priority Ordering User Department   01/05/20 Sent (none) Lazarus Russell MD [facility] FP/IM/PEDS   Order History   Outpatient   Date/Time Action Taken User Additional Information   01/02/20 1057 Pend Interface, Eprescribing    01/05/20 2306 Sign Lazarus Russell MD Reorder from Order: [id number]   01/05/20 2306 Taking Flag Checked Lazarus Russell MD    Outpatient Medication Detail      Disp Refills Start End NICHOLAS   traMADol (ULTRAM) 50 MG tablet 90 tablet 1 1/5/2020  No   Sig: TAKE 1 TO 2 TABLETS BY MOUTH THREE TIMES DAILY AS NEEDED FOR SEVERE PAIN   Sent to pharmacy as: traMADol (ULTRAM) 50 MG tablet   Class: E-Prescribe   Order: [id number]   E-Prescribing Status: Receipt confirmed by pharmacy (1/5/2020 11:06 PM CST)   Outpatient Morphine Equivalent Daily Dose (MEDD)     1/5/20 and after   Unknown   Order Name Dose Route Frequency Maximum MEDD    traMADol (ULTRAM) 50 MG tablet     Unknown   Total Potential Daily Morphine Equivalence Unknown   An error was encountered while attempting to calculate the morphine equivalent daily dose for at least one order.    Calculation Information                Printout Tracking     External Result Report   Pharmacy     [id number] - [place] - [address]

## 2020-01-06 NOTE — TELEPHONE ENCOUNTER
Spoke with patient -she finished her initial prescription of Acyclovir on 1-4-19.  Her right eye was feeling better, but now is beginning to feel irritated and is watering a lot.  She has been keeping her right eye shut secondary to the discomfort.  She did not realize she had a refill of Acyclovir available until today, so she will start taking it again.  I offered her an appointment tomorrow with Dr. Grewal, but she would like to see if restarting the medicine improves her symptoms.  She will let us know if she is not feeling better tomorrow.

## 2020-01-06 NOTE — TELEPHONE ENCOUNTER
Patient contacted and informed Rx approved today, call pharmacy to follow up before picking up.    Roel García CMA

## 2020-01-07 ENCOUNTER — TELEPHONE (OUTPATIENT)
Dept: FAMILY MEDICINE | Facility: CLINIC | Age: 70
End: 2020-01-07

## 2020-01-07 NOTE — TELEPHONE ENCOUNTER
Two refills were sent for the same medication since patient claims that [facility] pharmacy told Faviola that this provider will not refill her Tramadol (even though it was sent last 1/5/2020). Refer to encounter below.    Aydee Shea routed this conversation to  1st Floor Primary Care   Aydee Shea       1/6/20 1:06 PM   Note      Patient had called into her Pharmacy to get traMADol (ULTRAM) 50 MG tablet refilled but the [facility] said that Dr. Russell will not refill the prescription. Patient is having a lot of back pain and needs medication.      Please call [phone]     Okay to call anytime and leave a message.     Thank you.            INFORM PHARMACY TO JUST SIMPLY REFILL TRAMADOL WITH REFILL DATE OF 1/6/2020 AND DISREGARD RX FOR TRAMADOL WITH REFILL DATE OF 1/5/2020.

## 2020-01-07 NOTE — TELEPHONE ENCOUNTER
.Reason for call:  Medication Question  If this is a refill request, has the caller requested the refill from the pharmacy already? N/A  Will the patient be using a [facility]? No  Name of the pharmacy and phone number for the current request: [facility] in [place]  [phone]    Name of the medication requested: N/A    Other request: Pharmacist received two refills for the same Pt. Wanted to double check the medication with Provider.      Phone number to reach patient:  Other phone number:  [phone]    Best Time:  Anytime    Can we leave a detailed message on this number?  YES

## 2020-01-09 ENCOUNTER — OFFICE VISIT (OUTPATIENT)
Dept: OPHTHALMOLOGY | Facility: CLINIC | Age: 70
End: 2020-01-09
Payer: COMMERCIAL

## 2020-01-09 DIAGNOSIS — B02.30 HERPES ZOSTER OPHTHALMICUS: Primary | ICD-10-CM

## 2020-01-09 PROCEDURE — 92012 INTRM OPH EXAM EST PATIENT: CPT | Performed by: OPHTHALMOLOGY

## 2020-01-09 RX ORDER — NEOMYCIN POLYMYXIN B SULFATES AND DEXAMETHASONE 3.5; 10000; 1 MG/ML; [USP'U]/ML; MG/ML
1 SUSPENSION/ DROPS OPHTHALMIC 4 TIMES DAILY
Qty: 5 ML | Refills: 1 | Status: SHIPPED | OUTPATIENT
Start: 2020-01-09 | End: 2020-02-03

## 2020-01-09 ASSESSMENT — EXTERNAL EXAM - LEFT EYE: OS_EXAM: NORMAL

## 2020-01-09 ASSESSMENT — TONOMETRY
OD_IOP_MMHG: 16
IOP_METHOD: APPLANATION

## 2020-01-09 ASSESSMENT — SLIT LAMP EXAM - LIDS: COMMENTS: 2+ DERMATOCHALASIS, 1+ MEIBOMIAN GLAND DYSFUNCTION

## 2020-01-09 ASSESSMENT — VISUAL ACUITY
METHOD: SNELLEN - LINEAR
OD_SC: 20/30-1

## 2020-01-09 NOTE — LETTER
1/9/2020         RE: Faviola Bolivar  [address]  [place] [address]        Dear Colleague,    Thank you for referring your patient, Faviola Bolivar, to the [facility]. Please see a copy of my visit note below.     Current Eye Medications:  Valacyclovir every two hours awake and tears every hour awake.     Subjective:  2 wk recheck hzo right eye   Pt reports this eye is not getting any better , continues to water, hurt and be light sensiitve.     Objective:  See Ophthalmology Exam.       Assessment:  Herpes zoster ophthalmicus right eye with mild keratitis and iritis.  Spastic lower lid entropion.      Plan:  Continue Valacyclovir twice daily.  Add Rayray/Dex ointment right eye twice daily   Add Rayray/Dex drop right eye four times daily.  Use artificial tears as needed   Discuss with Dr. Rusesll regarding the medication Gabapentin for pain.   Return visit one week for an MD check.    Ismael Grewal M.D.  [phone]           Again, thank you for allowing me to participate in the care of your patient.        Sincerely,        Ismael Grewal MD

## 2020-01-09 NOTE — PROGRESS NOTES
Current Eye Medications:  Valacyclovir every two hours awake and tears every hour awake.     Subjective:  2 wk recheck hzo right eye   Pt reports this eye is not getting any better , continues to water, hurt and be light sensiitve.     Objective:  See Ophthalmology Exam.       Assessment:  Herpes zoster ophthalmicus right eye with mild keratitis and iritis.  Spastic lower lid entropion.      Plan:  Continue Valacyclovir twice daily.  Add Rayray/Dex ointment right eye twice daily   Add Rayray/Dex drop right eye four times daily.  Use artificial tears as needed   Discuss with Dr. Russell regarding the medication Gabapentin for pain.   Return visit one week for an MD check.    Ismael Grewal M.D.  [phone]

## 2020-01-09 NOTE — PATIENT INSTRUCTIONS
Continue Valacyclovir twice daily.  Add Rayray/Dex ointment right eye twice daily   Add Rayray/Dex drop right eye four times daily.  Use artificial tears as needed   Discuss with Dr. Russell regarding the medication Gabapentin for pain.   Return visit one week for an MD check.    Ismael Grewal M.D.  [phone]

## 2020-01-10 ENCOUNTER — TELEPHONE (OUTPATIENT)
Dept: FAMILY MEDICINE | Facility: CLINIC | Age: 70
End: 2020-01-10

## 2020-01-10 DIAGNOSIS — B02.29 POST HERPETIC NEURALGIA: Primary | ICD-10-CM

## 2020-01-10 NOTE — TELEPHONE ENCOUNTER
.Reason for call:  Medication   If this is a refill request, has the caller requested the refill from the pharmacy already? No  Will the patient be using a [facility]? No  Name of the pharmacy and phone number for the current request: [facility] in [place] on [address]    Name of the medication requested: Tom Pentin    Other request: Pt was diagnosed with Shingles and needs her primary doctor to prescribe this medication.     Phone number to reach patient:  Home number on file [phone] (home)    Best Time:  Anytime    Can we leave a detailed message on this number?  YES

## 2020-01-13 RX ORDER — GABAPENTIN 100 MG/1
100 CAPSULE ORAL 3 TIMES DAILY
Qty: 90 CAPSULE | Refills: 5 | Status: SHIPPED | OUTPATIENT
Start: 2020-01-13 | End: 2020-11-02

## 2020-01-13 NOTE — TELEPHONE ENCOUNTER
The pharmacy will notify patient when medication is ready for pickup.  Kelvin Witt,  For 1st Floor Primary Care ([facility])

## 2020-01-15 ENCOUNTER — OFFICE VISIT (OUTPATIENT)
Dept: OPHTHALMOLOGY | Facility: CLINIC | Age: 70
End: 2020-01-15
Payer: COMMERCIAL

## 2020-01-15 DIAGNOSIS — B02.30 HERPES ZOSTER OPHTHALMICUS: Primary | ICD-10-CM

## 2020-01-15 PROCEDURE — 92012 INTRM OPH EXAM EST PATIENT: CPT | Performed by: OPHTHALMOLOGY

## 2020-01-15 ASSESSMENT — VISUAL ACUITY
OS_SC+: -2
METHOD: SNELLEN - LINEAR
OD_SC+: -1
OD_SC: 20/30
OS_SC: 20/25

## 2020-01-15 ASSESSMENT — SLIT LAMP EXAM - LIDS: COMMENTS: 2+ DERMATOCHALASIS, 1+ MEIBOMIAN GLAND DYSFUNCTION

## 2020-01-15 ASSESSMENT — EXTERNAL EXAM - LEFT EYE: OS_EXAM: NORMAL

## 2020-01-15 NOTE — PROGRESS NOTES
Current Eye Medications:  Acyclovir twice a day, maxitrol right eye four times a day, maxitrol ointment right eye twice a day.  Gabapentin 100 mg three times daily.     Subjective:  Follow up zoster, right eye with mild keratitis and iritis.  Patient is here for a MD Check.  Right eye is feeling a little better.  Right eye is still photophobic, eyelids are red, and her vision is about the same.  She has lost 15 pounds in the past 4 weeks (when this started).       Objective:  See Ophthalmology Exam.       Assessment:  Herpes zoster ophthalmicus keratoconjunctivitis improving.  Persistent spastic entropion right lower lid.      Plan:  Continue Acyclovir twice daily,  Gabapentin as directed  Rayray/Dex ointment right eye twice daily,  And Rayray/Dex drop right eye four times daily.  Try taping eyelid down as demonstrated.  Discussed temporary suture repair or Botox for entropion.  Use artificial tears as needed.    Return visit in 3 for MD check.     Ismael Grewal M.D.  [phone]

## 2020-01-15 NOTE — LETTER
1/15/2020         RE: Faviola Bolivar  [address]  [place] [address]        Dear Colleague,    Thank you for referring your patient, Faviola Bolivar, to the [facility]. Please see a copy of my visit note below.     Current Eye Medications:  Acyclovir twice a day, maxitrol right eye four times a day, maxitrol ointment right eye twice a day.  Gabapentin 100 mg three times daily.     Subjective:  Follow up zoster, right eye with mild keratitis and iritis.  Patient is here for a MD Check.  Right eye is feeling a little better.  Right eye is still photophobic, eyelids are red, and her vision is about the same.  She has lost 15 pounds in the past 4 weeks (when this started).       Objective:  See Ophthalmology Exam.       Assessment:  Herpes zoster ophthalmicus keratoconjunctivitis improving.  Persistent spastic entropion right lower lid.      Plan:  Continue Acyclovir twice daily,  Gabapentin as directed  Rayray/Dex ointment right eye twice daily,  And Rayray/Dex drop right eye four times daily.  Try taping eyelid down as demonstrated.  Discussed temporary suture repair or Botox for entropion.  Use artificial tears as needed.    Return visit in 3 for MD check.     Ismael Grewal M.D.  [phone]           Again, thank you for allowing me to participate in the care of your patient.        Sincerely,        Ismael Grewal MD

## 2020-02-03 ENCOUNTER — OFFICE VISIT (OUTPATIENT)
Dept: OPHTHALMOLOGY | Facility: CLINIC | Age: 70
End: 2020-02-03
Payer: COMMERCIAL

## 2020-02-03 DIAGNOSIS — B02.30 HERPES ZOSTER OPHTHALMICUS: Primary | ICD-10-CM

## 2020-02-03 DIAGNOSIS — M54.50 CHRONIC LEFT-SIDED LOW BACK PAIN WITHOUT SCIATICA: ICD-10-CM

## 2020-02-03 DIAGNOSIS — G89.29 CHRONIC LEFT-SIDED LOW BACK PAIN WITHOUT SCIATICA: ICD-10-CM

## 2020-02-03 DIAGNOSIS — F11.90 CHRONIC, CONTINUOUS USE OF OPIOIDS: ICD-10-CM

## 2020-02-03 PROCEDURE — 92012 INTRM OPH EXAM EST PATIENT: CPT | Performed by: OPHTHALMOLOGY

## 2020-02-03 ASSESSMENT — VISUAL ACUITY
METHOD: SNELLEN - LINEAR
OD_SC: 20/30
OS_SC+: -1
OD_SC+: -1
OS_SC: 20/25

## 2020-02-03 ASSESSMENT — SLIT LAMP EXAM - LIDS: COMMENTS: 2+ DERMATOCHALASIS, 1+ MEIBOMIAN GLAND DYSFUNCTION

## 2020-02-03 ASSESSMENT — EXTERNAL EXAM - RIGHT EYE: OD_EXAM: NORMAL

## 2020-02-03 ASSESSMENT — TONOMETRY
OD_IOP_MMHG: 11
IOP_METHOD: APPLANATION

## 2020-02-03 ASSESSMENT — EXTERNAL EXAM - LEFT EYE: OS_EXAM: NORMAL

## 2020-02-03 NOTE — PROGRESS NOTES
" Current Eye Medications:  Acyclovir ran out.  Gabapentin three times a day.  The (Maxitrol) drops and ointment ran out one week ago.       Subjective:  Follow up zoster right eye, and entropion right lower eyelid.  Patient is here for a MD Check.  At night her right eye itches terribly - she sleeps with a cold cloth on her right eye.  Right eye is slightly less red, but \"my nerves are shot.\"  No vision change.       Objective:  See Ophthalmology Exam.       Assessment:  Herpes zoster ophthalmicus right eye slowly improving.  Spastic entropion right lower lid improved.      Plan:  Continue taking Gabapentin as directed.  Ok to discontinue Acyclovir and Maxitrol drops and ointment.  Can try taping eyelid as previously discussed.  Use Refresh PM (ointment) right eye at bedtime and Refresh tears during the day.   Return visit in 2 months for MD check.     Ismael Grewal M.D.  [phone]         "

## 2020-02-03 NOTE — TELEPHONE ENCOUNTER
Requested Prescriptions   Pending Prescriptions Disp Refills     traMADol (ULTRAM) 50 MG tablet [Pharmacy Med Name: TRAMADOL 50MG TABLETS]        Last Written Prescription Date:  01/06/2020  Last Fill Quantity: 90,   # refills: 1  Last Office Visit: 11/26/19-Vocal  Future Office visit:       Routing refill request to provider for review/approval because:  Drug not on the G, P or [facility] refill protocol or controlled substance 90 tablet      Sig: TAKE 1 TO 2 TABLETS BY MOUTH THREE TIMES DAILY AS NEEDED FOR SEVERE PAIN       There is no refill protocol information for this order

## 2020-02-03 NOTE — PATIENT INSTRUCTIONS
Continue taking Gabapentin as directed.  Ok to discontinue Acyclovir and Maxitrol drops and ointment.  Can try taping eyelid as previously discussed.  Use Refresh PM (ointment) right eye at bedtime and Refresh tears during the day.   Return visit in 2 months for MD check.     Ismael Grewal M.D.  [phone]

## 2020-02-03 NOTE — LETTER
"    2/3/2020         RE: Faviola Bolivar  [address]  [place] [address]        Dear Colleague,    Thank you for referring your patient, Faviola Bolivar, to the [facility]. Please see a copy of my visit note below.     Current Eye Medications:  Acyclovir ran out.  Gabapentin three times a day.  The (Maxitrol) drops and ointment ran out one week ago.       Subjective:  Follow up zoster right eye, and entropion right lower eyelid.  Patient is here for a MD Check.  At night her right eye itches terribly - she sleeps with a cold cloth on her right eye.  Right eye is slightly less red, but \"my nerves are shot.\"  No vision change.       Objective:  See Ophthalmology Exam.       Assessment:  Herpes zoster ophthalmicus right eye slowly improving.  Spastic entropion right lower lid improved.      Plan:  Continue taking Gabapentin as directed.  Ok to discontinue Acyclovir and Maxitrol drops and ointment.  Can try taping eyelid as previously discussed.  Use Refresh PM (ointment) right eye at bedtime and Refresh tears during the day.   Return visit in 2 months for MD check.     Ismael Grewal M.D.  [phone]           Again, thank you for allowing me to participate in the care of your patient.        Sincerely,        Ismael Grewal MD    "

## 2020-02-04 RX ORDER — TRAMADOL HYDROCHLORIDE 50 MG/1
TABLET ORAL
Qty: 90 TABLET | Refills: 1 | Status: SHIPPED | OUTPATIENT
Start: 2020-02-04 | End: 2020-03-05

## 2020-02-23 ENCOUNTER — HEALTH MAINTENANCE LETTER (OUTPATIENT)
Age: 70
End: 2020-02-23

## 2020-03-04 DIAGNOSIS — G89.29 CHRONIC LEFT-SIDED LOW BACK PAIN WITHOUT SCIATICA: ICD-10-CM

## 2020-03-04 DIAGNOSIS — F11.90 CHRONIC, CONTINUOUS USE OF OPIOIDS: ICD-10-CM

## 2020-03-04 DIAGNOSIS — M54.50 CHRONIC LEFT-SIDED LOW BACK PAIN WITHOUT SCIATICA: ICD-10-CM

## 2020-03-04 NOTE — TELEPHONE ENCOUNTER
Requested Prescriptions   Pending Prescriptions Disp Refills     traMADol (ULTRAM) 50 MG tablet [Pharmacy Med Name: TRAMADOL 50MG TABLETS] 90 tablet      Sig: TAKE 1 TO 2 TABLETS BY MOUTH THREE TIMES DAILY AS NEEDED FOR SEVERE PAIN       There is no refill protocol information for this order        Last Written Prescription Date:  2/4/20  Last Fill Quantity: 90,  # refills: 1   Last Office Visit with MIKEY, MALAP or [facility] prescribing provider:  11/26/19   Future Office Visit:           [facility]

## 2020-03-05 RX ORDER — TRAMADOL HYDROCHLORIDE 50 MG/1
TABLET ORAL
Qty: 90 TABLET | Refills: 1 | Status: SHIPPED | OUTPATIENT
Start: 2020-03-05 | End: 2020-04-06

## 2020-04-06 DIAGNOSIS — F11.90 CHRONIC, CONTINUOUS USE OF OPIOIDS: ICD-10-CM

## 2020-04-06 DIAGNOSIS — G89.29 CHRONIC LEFT-SIDED LOW BACK PAIN WITHOUT SCIATICA: ICD-10-CM

## 2020-04-06 DIAGNOSIS — M54.50 CHRONIC LEFT-SIDED LOW BACK PAIN WITHOUT SCIATICA: ICD-10-CM

## 2020-04-06 RX ORDER — TRAMADOL HYDROCHLORIDE 50 MG/1
TABLET ORAL
Qty: 90 TABLET | Refills: 0 | Status: SHIPPED | OUTPATIENT
Start: 2020-04-06 | End: 2020-04-23

## 2020-04-06 NOTE — TELEPHONE ENCOUNTER
Requested Prescriptions   Pending Prescriptions Disp Refills     traMADol (ULTRAM) 50 MG tablet [Pharmacy Med Name: TRAMADOL 50MG TABLETS]        Last Written Prescription Date:  03/05/2020  Last Fill Quantity: 90,   # refills: 1  Last Office Visit: 11/26/19-Vocal  Future Office visit:       Routing refill request to provider for review/approval because:  Drug not on the G, P or [facility] refill protocol or controlled substance 90 tablet      Sig: TAKE 1 TO 2 TABLETS BY MOUTH THREE TIMES DAILY AS NEEDED FOR SEVERE PAIN       There is no refill protocol information for this order

## 2020-04-22 DIAGNOSIS — M54.50 CHRONIC LEFT-SIDED LOW BACK PAIN WITHOUT SCIATICA: ICD-10-CM

## 2020-04-22 DIAGNOSIS — F11.90 CHRONIC, CONTINUOUS USE OF OPIOIDS: ICD-10-CM

## 2020-04-22 DIAGNOSIS — G89.29 CHRONIC LEFT-SIDED LOW BACK PAIN WITHOUT SCIATICA: ICD-10-CM

## 2020-04-23 RX ORDER — TRAMADOL HYDROCHLORIDE 50 MG/1
TABLET ORAL
Qty: 90 TABLET | Refills: 1 | Status: SHIPPED | OUTPATIENT
Start: 2020-04-23 | End: 2020-05-20

## 2020-04-23 NOTE — TELEPHONE ENCOUNTER
Requested Prescriptions   Pending Prescriptions Disp Refills     traMADol (ULTRAM) 50 MG tablet [Pharmacy Med Name: TRAMADOL 50MG TABLETS] 90 tablet      Sig: TAKE 1 TO 2 TABLETS BY MOUTH THREE TIMES DAILY AS NEEDED FOR SEVERE PAIN       There is no refill protocol information for this order        Routing refill request to provider for review/approval because:  Drug not on the [facility] refill protocol       Hiren Cannon RN, BSN, PHN

## 2020-05-19 DIAGNOSIS — M54.50 CHRONIC LEFT-SIDED LOW BACK PAIN WITHOUT SCIATICA: ICD-10-CM

## 2020-05-19 DIAGNOSIS — F11.90 CHRONIC, CONTINUOUS USE OF OPIOIDS: ICD-10-CM

## 2020-05-19 DIAGNOSIS — G89.29 CHRONIC LEFT-SIDED LOW BACK PAIN WITHOUT SCIATICA: ICD-10-CM

## 2020-05-20 RX ORDER — TRAMADOL HYDROCHLORIDE 50 MG/1
TABLET ORAL
Qty: 90 TABLET | Refills: 1 | Status: SHIPPED | OUTPATIENT
Start: 2020-05-20 | End: 2020-06-11

## 2020-05-20 NOTE — TELEPHONE ENCOUNTER
Controlled Substance Refill Request for Tramadol  Problem List Complete:  Yes  Chronic, continuous use of opioids   Problem Detail     Noted:  11/26/2019    Priority:  Medium    Overview Addendum 4/23/2020  5:52 PM by Lazarus Russell MD       Source of pain: Left-sided low back pain without sciatica. S/P lumbar spinal fusion at L5-S1 last 2010.  Patient is followed by Lazarus Russell MD for ongoing prescription of pain medication.  All refills should only be approved by this provider, or covering partner.     Medication(s): Tramadol 50 mg.   Maximum quantity per month: 180 tabs or 90 tabs per refill (lasts for 15 days)  MEDD 30  Clinic visit frequency required: Q 12 months.      Controlled substance agreement:  Encounter-Level CSA - 03/01/2018:    Controlled Substance Agreement - Scan on 3/19/2018 10:03 AM: CONTROLLED SUBSTANCE AGREEMENT      Encounter-Level CSA - 11/03/2015:    Controlled Substance Agreement - Scan on 11/9/2015  5:39 PM: CONTROLLED SUBSTANCE AGREEMENT      Patient-Level CSA:    There are no patient-level csa.         Pain Clinic evaluation in the past: No     DIRE Total Score(s):          D.I.R.E Score: Patient Selection for Chronic Opioid Analgesia     For each factor, rate the patient's score from 1 - 3 based on the explanations on the right.          Diagnosis             2         1 = Benign chronic condition with minimal objective findings or no definite medical diagnosis.  Examples:  fibromyalgia, migraine, headaches, non-specific back pain.  2 = Slowly progressive condition concordant with moderate pain, or fixed condition with moderate objective findings.  Examples: failed back surgery syndrome, back pain with moderate degenerative changes, neuropathic pain.     3 = Advanced condition concordant with severe pain with objective findings.  Examples: severe ischemic vascular disease, advanced neuropathy, severe spinal stenosis.    Intractability             2            1 = Few  therapies have been tried and the patient takes a passive role in his/her pain management process.      2 = Most costomary treatments have been tried but the patient is not fully engaged in the pain management process, or barriers prevent (insurance, transportation, medical illness)  3 = Patient fully engaged in a spectrum of appropriate treatments but with inadequate response.    Risk    (Risk = Total of P+C+R+S below)       Psychological             3         1 = Serious personality dysfunction or mental illness interfering with care.  Examples: personality disorder, severe affective disorder, significant personality issues.     2 = Personality or mental health interferes moderately.  Example: depression or anxiety disorder.     3 = Good communication with the clinic.  No significant personality dysfunction or mental illness.       Chemical      Health             3         1 = Active or very recent use of illicit drugs, excessive alcohol, or prescription drug abuse.     2 = Chemical coper (uses medications to cope with stress) or history of chemical dependency in remission.     3 = No CD history.  Not drug-focused or chemically reliant       Reliability             2         1 = History of numerous problems: medication misuse, missed appointments, rarely follows through.     2 = Occasional difficulties with compliance, but generally reliable.     3 = Highly reliable patient with medications, appointments and treatment.       Social      Support             2         1= Life in chaos.  Little family support and few close relationships.  Loss of most normal life roles.     2 = Reduction in some relationships and life roles.     3 = Supportive family/close relationships.  Involved in work or school and no social isolation.    Efficacy score             2         1 = Poor function or minimal pain relief despite moderate to high doses.     2 = Moderate benefit with function improved in a number of ways (or  insufficient info - hasn't tried opioid yet or very low doses or too short a trial.     3 = Good improvement in pain and function and quality of life with stable doses over time.                                     16    Total score = D + I + R + E     Score 7-13: Not a suitable candidate for long-term opioid analgesia  Score 14-21: May be a good candidate for long-term opioid analgesia     Copyright 2013 Babak Flynn MD, The DIRE Score: Predicting Outcomes of Opioid Prescribing for Chronic Pain. The Journal of Pain. 7(9) (September), 2006:671-681     Last [place] website verification:  done on 4/23/2020.   [email or web address]       checked in past 3 months?  Yes 4/23/2020   Last Written Prescription Date:  4/23/20  Last Fill Quantity: 90 tablets,  # refills: 1  Last office visit: 11/26/2019 with prescribing provider:  11/26/19   Future Office Visit:  None  RX monitoring program ([place]) reviewed:  not reviewed/not due - last done on 4/23/20    [place] profile:  [email or web address]            Hiren Cannon RN, BSN, PHN

## 2020-06-11 DIAGNOSIS — M54.50 CHRONIC LEFT-SIDED LOW BACK PAIN WITHOUT SCIATICA: ICD-10-CM

## 2020-06-11 DIAGNOSIS — G89.29 CHRONIC LEFT-SIDED LOW BACK PAIN WITHOUT SCIATICA: ICD-10-CM

## 2020-06-11 DIAGNOSIS — F11.90 CHRONIC, CONTINUOUS USE OF OPIOIDS: ICD-10-CM

## 2020-06-11 RX ORDER — TRAMADOL HYDROCHLORIDE 50 MG/1
TABLET ORAL
Qty: 90 TABLET | Refills: 1 | Status: SHIPPED | OUTPATIENT
Start: 2020-06-11 | End: 2020-07-13

## 2020-06-11 NOTE — TELEPHONE ENCOUNTER
Routing refill request to provider for review/approval because:  Drug not on the [facility] refill protocol     Katrina Fox RN, [facility] Triage

## 2020-08-03 DIAGNOSIS — G89.29 CHRONIC LEFT-SIDED LOW BACK PAIN WITHOUT SCIATICA: ICD-10-CM

## 2020-08-03 DIAGNOSIS — M54.50 CHRONIC LEFT-SIDED LOW BACK PAIN WITHOUT SCIATICA: ICD-10-CM

## 2020-08-03 DIAGNOSIS — F11.90 CHRONIC, CONTINUOUS USE OF OPIOIDS: ICD-10-CM

## 2020-08-05 RX ORDER — TRAMADOL HYDROCHLORIDE 50 MG/1
50-100 TABLET ORAL 3 TIMES DAILY PRN
Qty: 90 TABLET | Refills: 0 | Status: SHIPPED | OUTPATIENT
Start: 2020-08-05 | End: 2020-08-20

## 2020-08-05 NOTE — TELEPHONE ENCOUNTER
Patient got 90 tablets on 7/26/2020. Provider to address this early fill.      Controlled Substance Refill Request for tramadol  Problem List Complete:  Yes  Overview Addendum 8/5/2020  7:41 AM by Katrina Fox RN       Source of pain: Left-sided low back pain without sciatica. S/P lumbar spinal fusion at L5-S1 last 2010.  Patient is followed by Lazarus Russell MD for ongoing prescription of pain medication.  All refills should only be approved by this provider, or covering partner.     Medication(s): Tramadol 50 mg.   Maximum quantity per month: 180 tabs or 90 tabs per refill (lasts for 15 days)  MEDD 30  Clinic visit frequency required: Q 12 months.      Controlled substance agreement:  Encounter-Level CSA - 03/01/2018:    Controlled Substance Agreement - Scan on 3/19/2018 10:03 AM: CONTROLLED SUBSTANCE AGREEMENT      Encounter-Level CSA - 11/03/2015:    Controlled Substance Agreement - Scan on 11/9/2015  5:39 PM: CONTROLLED SUBSTANCE AGREEMENT      Patient-Level CSA:    There are no patient-level csa.         Pain Clinic evaluation in the past: No     DIRE Total Score(s):          D.I.R.E Score: Patient Selection for Chronic Opioid Analgesia     For each factor, rate the patient's score from 1 - 3 based on the explanations on the right.          Diagnosis             2         1 = Benign chronic condition with minimal objective findings or no definite medical diagnosis.  Examples:  fibromyalgia, migraine, headaches, non-specific back pain.  2 = Slowly progressive condition concordant with moderate pain, or fixed condition with moderate objective findings.  Examples: failed back surgery syndrome, back pain with moderate degenerative changes, neuropathic pain.     3 = Advanced condition concordant with severe pain with objective findings.  Examples: severe ischemic vascular disease, advanced neuropathy, severe spinal stenosis.    Intractability             2            1 = Few therapies have been tried  and the patient takes a passive role in his/her pain management process.      2 = Most costomary treatments have been tried but the patient is not fully engaged in the pain management process, or barriers prevent (insurance, transportation, medical illness)  3 = Patient fully engaged in a spectrum of appropriate treatments but with inadequate response.    Risk    (Risk = Total of P+C+R+S below)       Psychological             3         1 = Serious personality dysfunction or mental illness interfering with care.  Examples: personality disorder, severe affective disorder, significant personality issues.     2 = Personality or mental health interferes moderately.  Example: depression or anxiety disorder.     3 = Good communication with the clinic.  No significant personality dysfunction or mental illness.       Chemical      Health             3         1 = Active or very recent use of illicit drugs, excessive alcohol, or prescription drug abuse.     2 = Chemical coper (uses medications to cope with stress) or history of chemical dependency in remission.     3 = No CD history.  Not drug-focused or chemically reliant       Reliability             2         1 = History of numerous problems: medication misuse, missed appointments, rarely follows through.     2 = Occasional difficulties with compliance, but generally reliable.     3 = Highly reliable patient with medications, appointments and treatment.       Social      Support             2         1= Life in chaos.  Little family support and few close relationships.  Loss of most normal life roles.     2 = Reduction in some relationships and life roles.     3 = Supportive family/close relationships.  Involved in work or school and no social isolation.    Efficacy score             2         1 = Poor function or minimal pain relief despite moderate to high doses.     2 = Moderate benefit with function improved in a number of ways (or insufficient info - hasn't tried  opioid yet or very low doses or too short a trial.     3 = Good improvement in pain and function and quality of life with stable doses over time.                                     16    Total score = D + I + R + E     Score 7-13: Not a suitable candidate for long-term opioid analgesia  Score 14-21: May be a good candidate for long-term opioid analgesia     Copyright 2013 Babak Flynn MD, The DIRE Score: Predicting Outcomes of Opioid Prescribing for Chronic Pain. The Journal of Pain. 7(9) (September), 2006:671-681     Last [facility] website verification:  08/05/2020       Katrina Fox RN, [facility] Triage

## 2020-10-13 DIAGNOSIS — G89.29 CHRONIC LEFT-SIDED LOW BACK PAIN WITHOUT SCIATICA: ICD-10-CM

## 2020-10-13 DIAGNOSIS — M54.50 CHRONIC LEFT-SIDED LOW BACK PAIN WITHOUT SCIATICA: ICD-10-CM

## 2020-10-13 DIAGNOSIS — F11.90 CHRONIC, CONTINUOUS USE OF OPIOIDS: ICD-10-CM

## 2020-10-14 RX ORDER — TRAMADOL HYDROCHLORIDE 50 MG/1
TABLET ORAL
Qty: 90 TABLET | Refills: 0 | Status: SHIPPED | OUTPATIENT
Start: 2020-10-14 | End: 2020-10-28

## 2020-10-14 NOTE — TELEPHONE ENCOUNTER
15 day supply given without additional refills.    Inform patient that she is due for a virtual visit with this provider in order to receive future refills.

## 2020-11-02 ENCOUNTER — VIRTUAL VISIT (OUTPATIENT)
Dept: FAMILY MEDICINE | Facility: CLINIC | Age: 70
End: 2020-11-02
Payer: COMMERCIAL

## 2020-11-02 DIAGNOSIS — M85.80 OSTEOPENIA, UNSPECIFIED LOCATION: ICD-10-CM

## 2020-11-02 DIAGNOSIS — M54.50 CHRONIC LEFT-SIDED LOW BACK PAIN WITHOUT SCIATICA: ICD-10-CM

## 2020-11-02 DIAGNOSIS — G89.29 CHRONIC LEFT-SIDED LOW BACK PAIN WITHOUT SCIATICA: ICD-10-CM

## 2020-11-02 DIAGNOSIS — G89.4 CHRONIC PAIN SYNDROME: Primary | ICD-10-CM

## 2020-11-02 PROCEDURE — 99442 PR PHYSICIAN TELEPHONE EVALUATION 11-20 MIN: CPT | Mod: 95 | Performed by: INTERNAL MEDICINE

## 2020-11-02 RX ORDER — CALCIUM CARBONATE 500(1250)
1 TABLET ORAL 2 TIMES DAILY
COMMUNITY

## 2020-11-02 NOTE — PROGRESS NOTES
"Faviola Bolivar is a 70 year old female who is being evaluated via a billable telephone visit.      The patient has been notified of following:     \"This telephone visit will be conducted via a call between you and your physician/provider. We have found that certain health care needs can be provided without the need for a physical exam.  This service lets us provide the care you need with a short phone conversation.  If a prescription is necessary we can send it directly to your pharmacy.  If lab work is needed we can place an order for that and you can then stop by our lab to have the test done at a later time.    Telephone visits are billed at different rates depending on your insurance coverage. During this emergency period, for some insurers they may be billed the same as an in-person visit.  Please reach out to your insurance provider with any questions.    If during the course of the call the physician/provider feels a telephone visit is not appropriate, you will not be charged for this service.\"    Patient has given verbal consent for Telephone visit?  Yes    What phone number would you like to be contacted at? [phone]    How would you like to obtain your AVS? Mail a copy    [facility] Internal Medicine Progress Note           Interval History:     Faviola Bolivar is a 70 year old female who presents via phone visit today for the following health issues:    Chronic/Recurring Back Pain Follow Up      Where is your back pain located? (Select all that apply) low back left    How would you describe your back pain?  burning    Where does your back pain spread? the left buttock    Since your last clinic visit for back pain, how has your pain changed? unchanged    Does your back pain interfere with your job? Not applicable    Since your last visit, have you tried any new treatment? No              Significant Problems:   Patient Active Problem List   Diagnosis     Restless legs     Osteopenia     " Fibromyalgia syndrome     Moderate major depression (H)     Restless leg syndrome     Hyperlipidemia LDL goal <130     Advanced directives, counseling/discussion     Overweight (BMI 25.0-29.9)     Vitamin D deficiency     Diverticulosis of large intestine     Primary osteoarthritis of left knee     DDD (degenerative disc disease), lumbar     Chronic, continuous use of opioids     Chronic left-sided low back pain without sciatica     Major depression in remission (H)     Herpes zoster ophthalmicus, od              Review of Systems:   CONSTITUTIONAL: NEGATIVE for fever, chills, change in weight  INTEGUMENTARY/SKIN: NEGATIVE for worrisome rashes, moles or lesions  EYES: NEGATIVE for vision changes or irritation  ENT/MOUTH: NEGATIVE for ear, mouth and throat problems  RESP: NEGATIVE for significant cough or SOB  BREAST: NEGATIVE for masses, tenderness or discharge  CV: NEGATIVE for chest pain, palpitations or peripheral edema  GI: NEGATIVE for nausea, abdominal pain, heartburn, or change in bowel habits  : NEGATIVE for frequency, dysuria, or hematuria  MUSCULOSKELETAL:As above.  NEURO: NEGATIVE for weakness, dizziness or paresthesias  ENDOCRINE: POSITIVE  for osteopenia.  HEME: NEGATIVE for bleeding problems  PSYCHIATRIC: NEGATIVE for changes in mood or affect            Medications:     Current Outpatient Medications   Medication Sig     calcium carbonate (OS-KAROLINA) 500 MG tablet Take 1 tablet by mouth 2 times daily     traMADol (ULTRAM) 50 MG tablet TAKE 1 TO 2 TABLETS(50  MG) BY MOUTH THREE TIMES DAILY AS NEEDED FOR SEVERE PAIN     No current facility-administered medications for this visit.              Physical Exam:   Vital signs not obtained due to nature of visit.    Remainder of exam not performed due to nature of visit.          Data:     DIAGNOSTICS    XR LUMBAR SPINE TWO-THREE VIEWS   11/26/2019 5:04 PM      HISTORY: Chronic left-sided low back pain without sciatica. Chronic,  continuous use of  opioids.     COMPARISON: None.                                                                      IMPRESSION: There appears to be bridging syndesmophyte and facet  arthropathy at the L5-S1 level with probable arthritic fusion.  Posterior alignment is normal. Mild degenerative disc and facet  disease is also noted at other levels. No evidence for fracture.     JOHN VELOZ MD           Assessment and Plan:   1. Chronic pain syndrome    - Drug Abuse Screen Panel 13, Urine (Pain Care Package); Standing    2. Chronic left-sided low back pain without sciatica    - XR Lumbar Spine 2/3 Views; Future    3. Osteopenia, unspecified location  Continue over-the-counter calcium with Vitamin D.    Disposition: Follow-up in 6 months.      Lazarus Russell MD  Internal Medicine  [facility] Team      Phone call duration: 11 minutes  Start: 9:05 AM  End: 9:16 AM

## 2020-11-17 ENCOUNTER — ANCILLARY PROCEDURE (OUTPATIENT)
Dept: GENERAL RADIOLOGY | Facility: CLINIC | Age: 70
End: 2020-11-17
Attending: INTERNAL MEDICINE
Payer: COMMERCIAL

## 2020-11-17 DIAGNOSIS — M54.50 CHRONIC LEFT-SIDED LOW BACK PAIN WITHOUT SCIATICA: ICD-10-CM

## 2020-11-17 DIAGNOSIS — G89.4 CHRONIC PAIN SYNDROME: ICD-10-CM

## 2020-11-17 DIAGNOSIS — G89.29 CHRONIC LEFT-SIDED LOW BACK PAIN WITHOUT SCIATICA: ICD-10-CM

## 2020-11-17 PROCEDURE — 72100 X-RAY EXAM L-S SPINE 2/3 VWS: CPT | Performed by: RADIOLOGY

## 2020-11-17 PROCEDURE — 80306 DRUG TEST PRSMV INSTRMNT: CPT | Performed by: INTERNAL MEDICINE

## 2020-12-06 ENCOUNTER — HEALTH MAINTENANCE LETTER (OUTPATIENT)
Age: 70
End: 2020-12-06

## 2021-01-07 ENCOUNTER — TELEPHONE (OUTPATIENT)
Dept: FAMILY MEDICINE | Facility: CLINIC | Age: 71
End: 2021-01-07

## 2021-01-07 NOTE — TELEPHONE ENCOUNTER
Plan does not cover traMADol (ULTRAM) 50 MG tablet.  Please call [phone] to initiate Prior Auth or change med.    Insurance type and ID number: ID# [id number]      Additional Information: [place]  on 2020.    Aubrie Souza

## 2021-02-02 PROBLEM — F11.90 CHRONIC, CONTINUOUS USE OF OPIOIDS: Status: ACTIVE | Noted: 2019-11-26

## 2021-02-25 NOTE — TELEPHONE ENCOUNTER
Central Prior Authorization Team   Phone: [phone]      PA Initiation    Medication: traMADol (ULTRAM) 50 MG tablet-Initiated  Insurance Company: [employer] - Phone [phone] Fax [phone]  Pharmacy Filling the Rx: [id number] - [place], [place] - [address] AT [address]  Filling Pharmacy Phone: [phone]  Filling Pharmacy Fax:    Start Date: 2/25/2021

## 2021-02-25 NOTE — TELEPHONE ENCOUNTER
Prior Authorization Approval    Authorization Effective Date: 1/26/2021  Authorization Expiration Date: 2/25/2022  Medication: traMADol (ULTRAM) 50 MG tablet-APPROVED  Approved Dose/Quantity:   Reference #:     Insurance Company: [employer] - Phone [phone] Fax [phone]  Expected CoPay:       CoPay Card Available:      Foundation Assistance Needed:    Which Pharmacy is filling the prescription (Not needed for infusion/clinic administered): [id number] - [place], [place] - [address] AT [address]  Pharmacy Notified: Yes  Patient Notified: No    Pharmacy will notify patient when medication is ready.

## 2021-03-03 DIAGNOSIS — F11.90 CHRONIC, CONTINUOUS USE OF OPIOIDS: ICD-10-CM

## 2021-03-03 DIAGNOSIS — G89.29 CHRONIC LEFT-SIDED LOW BACK PAIN WITHOUT SCIATICA: ICD-10-CM

## 2021-03-03 DIAGNOSIS — M54.50 CHRONIC LEFT-SIDED LOW BACK PAIN WITHOUT SCIATICA: ICD-10-CM

## 2021-03-03 NOTE — TELEPHONE ENCOUNTER
Routing refill request to provider for review/approval because:  Drug not on the [facility] refill protocol     Katrina VAZQUEZN, RN, CPN

## 2021-03-04 RX ORDER — TRAMADOL HYDROCHLORIDE 50 MG/1
TABLET ORAL
Qty: 90 TABLET | Refills: 1 | Status: SHIPPED | OUTPATIENT
Start: 2021-03-04 | End: 2021-04-01

## 2021-03-04 NOTE — TELEPHONE ENCOUNTER
Order History  Outpatient  Date/Time Action Taken User Additional Information   03/03/21 1459 Pend Interface, Eprescribing    03/04/21 0752 Sign Lazarus Russell MD Reorder from Order:[id number]   03/04/21 0752 Taking Flag Checked Lazarus Russell MD    Outpatient Medication Detail     Disp Refills Start End NICHOLAS   traMADol (ULTRAM) 50 MG tablet 90 tablet 1 3/4/2021  No   Sig: TAKE 1 TO 2 TABLETS(50  MG) BY MOUTH THREE TIMES DAILY AS NEEDED FOR SEVERE PAIN   Sent to pharmacy as: traMADol HCl 50 MG Oral Tablet (ULTRAM)   Class: E-Prescribe   Order: [id number]   E-Prescribing Status: Receipt confirmed by pharmacy (3/4/2021  7:52 AM CST)

## 2021-04-11 ENCOUNTER — HEALTH MAINTENANCE LETTER (OUTPATIENT)
Age: 71
End: 2021-04-11
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
## 2021-09-25 ENCOUNTER — HEALTH MAINTENANCE LETTER (OUTPATIENT)
Age: 71
End: 2021-09-25

## 2021-10-25 DIAGNOSIS — G89.29 CHRONIC LEFT-SIDED LOW BACK PAIN WITHOUT SCIATICA: ICD-10-CM

## 2021-10-25 DIAGNOSIS — F11.90 CHRONIC, CONTINUOUS USE OF OPIOIDS: ICD-10-CM

## 2021-10-25 DIAGNOSIS — M54.50 CHRONIC LEFT-SIDED LOW BACK PAIN WITHOUT SCIATICA: ICD-10-CM

## 2021-10-26 RX ORDER — TRAMADOL HYDROCHLORIDE 50 MG/1
TABLET ORAL
Qty: 90 TABLET | Refills: 1 | Status: SHIPPED | OUTPATIENT
Start: 2021-10-26 | End: 2021-11-24

## 2022-04-25 NOTE — LETTER
October 2, 2017      Faviola Bolivar  [address]   [place] [address]    Dear Faviola Bolivar,     At [facility]  we care about your health and are committed to providing quality patient care, which includes staying current on preventative cancer screenings.  You can increase your chances of finding and treating cancers through regular screenings.      Our records show that you are due for the following screening:      Mammogram for breast cancer   Recommended every 1-2 years for women age 50 and older  Mammograms help detect breast cancer, which is the most common cancer among women in the [place].  You may need to start having mammograms earlier and more often if you have had breast cancer, breast problems, or a family history of breast cancer.       You may contact us by phone at [facility] at [phone] to schedule your mammogram at your earliest convenience.    If you have already had a mammogram at another facility, please call us so that we may update your chart.      Your partners in health,      Quality Committee   [facility]      118

## 2022-05-07 ENCOUNTER — HEALTH MAINTENANCE LETTER (OUTPATIENT)
Age: 72
End: 2022-05-07

## 2022-06-02 DIAGNOSIS — M54.50 CHRONIC LEFT-SIDED LOW BACK PAIN WITHOUT SCIATICA: ICD-10-CM

## 2022-06-02 DIAGNOSIS — G89.29 CHRONIC LEFT-SIDED LOW BACK PAIN WITHOUT SCIATICA: ICD-10-CM

## 2022-06-02 DIAGNOSIS — F11.90 CHRONIC, CONTINUOUS USE OF OPIOIDS: ICD-10-CM

## 2022-06-02 RX ORDER — TRAMADOL HYDROCHLORIDE 50 MG/1
TABLET ORAL
Qty: 90 TABLET | Refills: 1 | Status: SHIPPED | OUTPATIENT
Start: 2022-06-02 | End: 2022-07-01

## 2022-06-22 ENCOUNTER — TELEPHONE (OUTPATIENT)
Dept: FAMILY MEDICINE | Facility: CLINIC | Age: 72
End: 2022-06-22

## 2022-06-22 NOTE — LETTER
June 22, 2022    Faviola Bolivar  [address]   [place] [address]    Dear Faviola Bolivar,     At [facility] we care about your health and are committed to providing quality patient care.    Which includes staying current on preventive cancer screenings.  You can increase your chances of finding and treating cancers through regular screenings.      Our records indicate you may be due for the following preventive screening(s):  Breast Cancer Screening - Mammogram  Depression  -  PHQ-9 Needed  Physical - Medicare Annual Wellness Visit   Immunizations  -  Covid, Hepatitis A, Tdap and Zoster      To schedule an appointment or discuss this screening further, you may contact us by phone at the [facility] at [phone] or online through the patient portal/[employer] @ [email or web address]    If you have had any of the screenings listed above at another facility, please call us so that we may update your chart.      Your partners in health,      Quality Committee at [facility]/Cu

## 2022-06-22 NOTE — TELEPHONE ENCOUNTER
Patient Quality Outreach    Patient is due for the following:   Breast Cancer Screening - Mammogram  Depression  -  PHQ-9 Needed  Physical - Medicare Annual Wellness Visit   Immunizations  -  Covid, Hepatitis A, Tdap and Zoster    NEXT STEPS:   Schedule a yearly physical to address item above     Type of outreach:    Sent letter.      Questions for provider review:    None     Clair Adams MA

## 2022-11-11 DIAGNOSIS — F11.90 CHRONIC, CONTINUOUS USE OF OPIOIDS: ICD-10-CM

## 2022-11-11 DIAGNOSIS — M54.50 CHRONIC LEFT-SIDED LOW BACK PAIN WITHOUT SCIATICA: ICD-10-CM

## 2022-11-11 DIAGNOSIS — G89.29 CHRONIC LEFT-SIDED LOW BACK PAIN WITHOUT SCIATICA: ICD-10-CM

## 2022-11-14 RX ORDER — TRAMADOL HYDROCHLORIDE 50 MG/1
TABLET ORAL
Qty: 90 TABLET | Refills: 1 | Status: SHIPPED | OUTPATIENT
Start: 2022-11-14 | End: 2022-12-15

## 2022-11-30 ENCOUNTER — TELEPHONE (OUTPATIENT)
Dept: FAMILY MEDICINE | Facility: CLINIC | Age: 72
End: 2022-11-30
Payer: COMMERCIAL

## 2022-11-30 NOTE — TELEPHONE ENCOUNTER
Patient Quality Outreach    Patient is due for the following:   Breast Cancer Screening - Mammogram  Physical Annual Wellness Visit      Topic Date Due     COVID-19 Vaccine (1) Never done     Zoster (Shingles) Vaccine (1 of 2) Never done     Diptheria Tetanus Pertussis (DTAP/TDAP/TD) Vaccine (3 - Td or Tdap) 01/03/2022     Flu Vaccine (1) 09/01/2022     Chronic Opioid Use -  Treatment Agreement (CSA), Urine Drug Screen, ASHLEE-7 and PHQ-9    Next Steps:   Patient was assigned appropriate questionnaire to complete    Type of outreach:    Phone, left message for patient/parent to call back. and Sent letter.      Questions for provider review:    None     Sara Bethea MA

## 2022-11-30 NOTE — LETTER
November 30, 2022    Faviola Bolivar  [address]   [place] [address]    Dear Faviola Bolivar,     At [facility] we care about your health and are committed to providing quality patient care.    Which includes staying current on preventive cancer screenings.  You can increase your chances of finding and treating cancers through regular screenings.      Our records indicate you may be due for the following preventive screening(s):  Breast Cancer Screening - Mammogram  Physical Annual Wellness Visit           Topic Date Due     COVID-19 Vaccine (1) Never done     Zoster (Shingles) Vaccine (1 of 2) Never done     Diptheria Tetanus Pertussis (DTAP/TDAP/TD) Vaccine (3 - Td or Tdap) 01/03/2022     Flu Vaccine (1) 09/01/2022      Chronic Opioid Use -  Treatment Agreement (CSA), Urine Drug Screen, ASHLEE-7 and PHQ-9         To schedule an appointment or discuss this screening further, you may contact us by phone at the [facility] at [phone] or online through the patient portal/[email or web address] @ [email or web address]    If you have had any of the screenings listed above at another facility, please call us so that we may update your chart.      Your partners in health,      Quality Committee at [facility]

## 2023-02-02 DIAGNOSIS — M54.50 CHRONIC LEFT-SIDED LOW BACK PAIN WITHOUT SCIATICA: ICD-10-CM

## 2023-02-02 DIAGNOSIS — G89.29 CHRONIC LEFT-SIDED LOW BACK PAIN WITHOUT SCIATICA: ICD-10-CM

## 2023-02-02 DIAGNOSIS — F11.90 CHRONIC, CONTINUOUS USE OF OPIOIDS: ICD-10-CM

## 2023-02-06 RX ORDER — TRAMADOL HYDROCHLORIDE 50 MG/1
TABLET ORAL
Qty: 90 TABLET | Refills: 1 | Status: SHIPPED | OUTPATIENT
Start: 2023-02-06 | End: 2023-03-06

## 2023-03-03 DIAGNOSIS — G89.29 CHRONIC LEFT-SIDED LOW BACK PAIN WITHOUT SCIATICA: ICD-10-CM

## 2023-03-03 DIAGNOSIS — M54.50 CHRONIC LEFT-SIDED LOW BACK PAIN WITHOUT SCIATICA: ICD-10-CM

## 2023-03-03 DIAGNOSIS — F11.90 CHRONIC, CONTINUOUS USE OF OPIOIDS: ICD-10-CM

## 2023-03-06 RX ORDER — TRAMADOL HYDROCHLORIDE 50 MG/1
TABLET ORAL
Qty: 90 TABLET | Refills: 1 | Status: SHIPPED | OUTPATIENT
Start: 2023-03-06 | End: 2023-03-30

## 2023-03-30 DIAGNOSIS — G89.29 CHRONIC LEFT-SIDED LOW BACK PAIN WITHOUT SCIATICA: ICD-10-CM

## 2023-03-30 DIAGNOSIS — F11.90 CHRONIC, CONTINUOUS USE OF OPIOIDS: ICD-10-CM

## 2023-03-30 DIAGNOSIS — M54.50 CHRONIC LEFT-SIDED LOW BACK PAIN WITHOUT SCIATICA: ICD-10-CM

## 2023-03-30 RX ORDER — TRAMADOL HYDROCHLORIDE 50 MG/1
TABLET ORAL
Qty: 90 TABLET | Refills: 1 | Status: SHIPPED | OUTPATIENT
Start: 2023-03-30 | End: 2023-04-30

## 2023-04-22 ENCOUNTER — HEALTH MAINTENANCE LETTER (OUTPATIENT)
Age: 73
End: 2023-04-22

## 2023-04-26 DIAGNOSIS — G89.29 CHRONIC LEFT-SIDED LOW BACK PAIN WITHOUT SCIATICA: ICD-10-CM

## 2023-04-26 DIAGNOSIS — M54.50 CHRONIC LEFT-SIDED LOW BACK PAIN WITHOUT SCIATICA: ICD-10-CM

## 2023-04-26 DIAGNOSIS — F11.90 CHRONIC, CONTINUOUS USE OF OPIOIDS: ICD-10-CM

## 2023-04-30 RX ORDER — TRAMADOL HYDROCHLORIDE 50 MG/1
TABLET ORAL
Qty: 90 TABLET | Refills: 1 | Status: SHIPPED | OUTPATIENT
Start: 2023-04-30 | End: 2023-05-24

## 2023-05-24 DIAGNOSIS — M54.50 CHRONIC LEFT-SIDED LOW BACK PAIN WITHOUT SCIATICA: ICD-10-CM

## 2023-05-24 DIAGNOSIS — F11.90 CHRONIC, CONTINUOUS USE OF OPIOIDS: ICD-10-CM

## 2023-05-24 DIAGNOSIS — G89.29 CHRONIC LEFT-SIDED LOW BACK PAIN WITHOUT SCIATICA: ICD-10-CM

## 2023-05-24 RX ORDER — TRAMADOL HYDROCHLORIDE 50 MG/1
TABLET ORAL
Qty: 90 TABLET | Refills: 1 | Status: SHIPPED | OUTPATIENT
Start: 2023-05-27 | End: 2023-06-21

## 2023-06-02 ENCOUNTER — HEALTH MAINTENANCE LETTER (OUTPATIENT)
Age: 73
End: 2023-06-02

## 2023-06-19 DIAGNOSIS — F11.90 CHRONIC, CONTINUOUS USE OF OPIOIDS: ICD-10-CM

## 2023-06-19 DIAGNOSIS — M54.50 CHRONIC LEFT-SIDED LOW BACK PAIN WITHOUT SCIATICA: ICD-10-CM

## 2023-06-19 DIAGNOSIS — G89.29 CHRONIC LEFT-SIDED LOW BACK PAIN WITHOUT SCIATICA: ICD-10-CM

## 2023-06-21 RX ORDER — TRAMADOL HYDROCHLORIDE 50 MG/1
TABLET ORAL
Qty: 90 TABLET | Refills: 1 | Status: SHIPPED | OUTPATIENT
Start: 2023-06-21 | End: 2023-07-16

## 2023-06-21 NOTE — TELEPHONE ENCOUNTER
Called and spoke to the patient and gave her Dr Russell's message. Patient understands and will call back to schedule.  Radha Dan MA  [facility]   Primary Care

## 2023-07-16 DIAGNOSIS — F11.90 CHRONIC, CONTINUOUS USE OF OPIOIDS: ICD-10-CM

## 2023-07-16 DIAGNOSIS — M54.50 CHRONIC LEFT-SIDED LOW BACK PAIN WITHOUT SCIATICA: ICD-10-CM

## 2023-07-16 DIAGNOSIS — G89.29 CHRONIC LEFT-SIDED LOW BACK PAIN WITHOUT SCIATICA: ICD-10-CM

## 2023-07-16 RX ORDER — TRAMADOL HYDROCHLORIDE 50 MG/1
TABLET ORAL
Qty: 90 TABLET | Refills: 1 | Status: SHIPPED | OUTPATIENT
Start: 2023-07-18 | End: 2023-08-13

## 2023-08-13 DIAGNOSIS — G89.29 CHRONIC LEFT-SIDED LOW BACK PAIN WITHOUT SCIATICA: ICD-10-CM

## 2023-08-13 DIAGNOSIS — M54.50 CHRONIC LEFT-SIDED LOW BACK PAIN WITHOUT SCIATICA: ICD-10-CM

## 2023-08-13 DIAGNOSIS — F11.90 CHRONIC, CONTINUOUS USE OF OPIOIDS: ICD-10-CM

## 2023-08-13 RX ORDER — TRAMADOL HYDROCHLORIDE 50 MG/1
TABLET ORAL
Qty: 90 TABLET | Refills: 1 | Status: SHIPPED | OUTPATIENT
Start: 2023-08-16 | End: 2023-09-12

## 2023-08-23 ENCOUNTER — TELEPHONE (OUTPATIENT)
Dept: FAMILY MEDICINE | Facility: CLINIC | Age: 73
End: 2023-08-23
Payer: COMMERCIAL

## 2023-08-23 NOTE — TELEPHONE ENCOUNTER
Patient Quality Outreach    Patient is due for the following:   Breast Cancer Screening - Mammogram  Depression  -  PHQ-9 needed  Physical Annual Wellness Visit  Chronic Opioid Use -  Treatment Agreement (CSA), Urine Drug Screen, ASHLEE-7, and PHQ-9    Next Steps:   Schedule a Annual Wellness Visit    Type of outreach:    Phone, left message for patient/parent to call back.    Next Steps:  Reach out within 90 days via [employer].    Max number of attempts reached: No. Will try again in 90 days if patient still on fail list.    Questions for provider review:    None           Razia Yoo

## 2023-09-12 DIAGNOSIS — M54.50 CHRONIC LEFT-SIDED LOW BACK PAIN WITHOUT SCIATICA: ICD-10-CM

## 2023-09-12 DIAGNOSIS — G89.29 CHRONIC LEFT-SIDED LOW BACK PAIN WITHOUT SCIATICA: ICD-10-CM

## 2023-09-12 DIAGNOSIS — F11.90 CHRONIC, CONTINUOUS USE OF OPIOIDS: ICD-10-CM

## 2023-09-12 RX ORDER — TRAMADOL HYDROCHLORIDE 50 MG/1
TABLET ORAL
Qty: 90 TABLET | Refills: 1 | Status: SHIPPED | OUTPATIENT
Start: 2023-09-12 | End: 2023-10-12

## 2023-09-27 NOTE — PATIENT INSTRUCTIONS
Patient Education   Personalized Prevention Plan  You are due for the preventive services outlined below.  Your care team is available to assist you in scheduling these services.  If you have already completed any of these items, please share that information with your care team to update in your medical record.  Health Maintenance Due   Topic Date Due     CONTROLLED SUBSTANCE AGREEMENT FOR CHRONIC PAIN MANAGEMENT  Never done     Zoster (Shingles) Vaccine (1 of 2) Never done     Annual Wellness Visit  09/18/2015     FALL RISK ASSESSMENT  03/01/2019     Discuss Advance Care Planning  02/25/2020     Mammogram  03/14/2020     Cholesterol Lab  08/27/2020     Anxiety Assessment  11/26/2020     Depression Assessment  11/26/2020     COVID-19 Vaccine (3 - Pfizer series) 08/10/2021     URINE DRUG SCREEN  11/17/2021     Diptheria Tetanus Pertussis (DTAP/TDAP/TD) Vaccine (2 - Td or Tdap) 01/03/2022     Flu Vaccine (1) 09/01/2023
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
## 2023-10-04 ENCOUNTER — OFFICE VISIT (OUTPATIENT)
Dept: FAMILY MEDICINE | Facility: CLINIC | Age: 73
End: 2023-10-04
Payer: COMMERCIAL

## 2023-10-04 VITALS
TEMPERATURE: 98.6 F | HEART RATE: 91 BPM | OXYGEN SATURATION: 97 % | BODY MASS INDEX: 26.49 KG/M2 | DIASTOLIC BLOOD PRESSURE: 66 MMHG | RESPIRATION RATE: 16 BRPM | SYSTOLIC BLOOD PRESSURE: 103 MMHG | WEIGHT: 159 LBS | HEIGHT: 65 IN

## 2023-10-04 DIAGNOSIS — E78.5 HYPERLIPIDEMIA LDL GOAL <130: ICD-10-CM

## 2023-10-04 DIAGNOSIS — Z00.00 ENCOUNTER FOR MEDICARE ANNUAL WELLNESS EXAM: Primary | ICD-10-CM

## 2023-10-04 DIAGNOSIS — R41.89 COGNITIVE DEFICITS: ICD-10-CM

## 2023-10-04 DIAGNOSIS — Z12.31 VISIT FOR SCREENING MAMMOGRAM: ICD-10-CM

## 2023-10-04 LAB
BASO+EOS+MONOS # BLD AUTO: NORMAL 10*3/UL
BASO+EOS+MONOS NFR BLD AUTO: NORMAL %
BASOPHILS # BLD AUTO: 0 10E3/UL (ref 0–0.2)
BASOPHILS NFR BLD AUTO: 0 %
EOSINOPHIL # BLD AUTO: 0.2 10E3/UL (ref 0–0.7)
EOSINOPHIL NFR BLD AUTO: 2 %
ERYTHROCYTE [DISTWIDTH] IN BLOOD BY AUTOMATED COUNT: 13 % (ref 10–15)
HCT VFR BLD AUTO: 40.7 % (ref 35–47)
HGB BLD-MCNC: 13.2 G/DL (ref 11.7–15.7)
IMM GRANULOCYTES # BLD: 0 10E3/UL
IMM GRANULOCYTES NFR BLD: 0 %
LYMPHOCYTES # BLD AUTO: 2.2 10E3/UL (ref 0.8–5.3)
LYMPHOCYTES NFR BLD AUTO: 32 %
MCH RBC QN AUTO: 28.4 PG (ref 26.5–33)
MCHC RBC AUTO-ENTMCNC: 32.4 G/DL (ref 31.5–36.5)
MCV RBC AUTO: 88 FL (ref 78–100)
MONOCYTES # BLD AUTO: 0.7 10E3/UL (ref 0–1.3)
MONOCYTES NFR BLD AUTO: 10 %
NEUTROPHILS # BLD AUTO: 3.8 10E3/UL (ref 1.6–8.3)
NEUTROPHILS NFR BLD AUTO: 56 %
PLATELET # BLD AUTO: 336 10E3/UL (ref 150–450)
RBC # BLD AUTO: 4.65 10E6/UL (ref 3.8–5.2)
WBC # BLD AUTO: 6.8 10E3/UL (ref 4–11)

## 2023-10-04 PROCEDURE — 85025 COMPLETE CBC W/AUTO DIFF WBC: CPT | Performed by: PHYSICIAN ASSISTANT

## 2023-10-04 PROCEDURE — 36415 COLL VENOUS BLD VENIPUNCTURE: CPT | Performed by: PHYSICIAN ASSISTANT

## 2023-10-04 PROCEDURE — 80061 LIPID PANEL: CPT | Performed by: PHYSICIAN ASSISTANT

## 2023-10-04 PROCEDURE — G0008 ADMIN INFLUENZA VIRUS VAC: HCPCS | Performed by: PHYSICIAN ASSISTANT

## 2023-10-04 PROCEDURE — 90662 IIV NO PRSV INCREASED AG IM: CPT | Performed by: PHYSICIAN ASSISTANT

## 2023-10-04 PROCEDURE — G0438 PPPS, INITIAL VISIT: HCPCS | Performed by: PHYSICIAN ASSISTANT

## 2023-10-04 PROCEDURE — 80048 BASIC METABOLIC PNL TOTAL CA: CPT | Performed by: PHYSICIAN ASSISTANT

## 2023-10-04 ASSESSMENT — ENCOUNTER SYMPTOMS
DIARRHEA: 0
PARESTHESIAS: 0
WEAKNESS: 0
FEVER: 0
JOINT SWELLING: 0
DIZZINESS: 0
CONSTIPATION: 0
ARTHRALGIAS: 0
PALPITATIONS: 0
DYSURIA: 0
HEARTBURN: 0
SORE THROAT: 0
BREAST MASS: 0
NERVOUS/ANXIOUS: 0
ABDOMINAL PAIN: 0
SHORTNESS OF BREATH: 0
HEADACHES: 0
NAUSEA: 0
HEMATOCHEZIA: 0
EYE PAIN: 0
CHILLS: 0
COUGH: 0
MYALGIAS: 1
FREQUENCY: 0
HEMATURIA: 0

## 2023-10-04 ASSESSMENT — PATIENT HEALTH QUESTIONNAIRE - PHQ9
10. IF YOU CHECKED OFF ANY PROBLEMS, HOW DIFFICULT HAVE THESE PROBLEMS MADE IT FOR YOU TO DO YOUR WORK, TAKE CARE OF THINGS AT HOME, OR GET ALONG WITH OTHER PEOPLE: NOT DIFFICULT AT ALL
SUM OF ALL RESPONSES TO PHQ QUESTIONS 1-9: 0
SUM OF ALL RESPONSES TO PHQ QUESTIONS 1-9: 0

## 2023-10-04 ASSESSMENT — ANXIETY QUESTIONNAIRES
GAD7 TOTAL SCORE: 0
7. FEELING AFRAID AS IF SOMETHING AWFUL MIGHT HAPPEN: NOT AT ALL
2. NOT BEING ABLE TO STOP OR CONTROL WORRYING: NOT AT ALL
5. BEING SO RESTLESS THAT IT IS HARD TO SIT STILL: NOT AT ALL
GAD7 TOTAL SCORE: 0
4. TROUBLE RELAXING: NOT AT ALL
6. BECOMING EASILY ANNOYED OR IRRITABLE: NOT AT ALL
1. FEELING NERVOUS, ANXIOUS, OR ON EDGE: NOT AT ALL
3. WORRYING TOO MUCH ABOUT DIFFERENT THINGS: NOT AT ALL
IF YOU CHECKED OFF ANY PROBLEMS ON THIS QUESTIONNAIRE, HOW DIFFICULT HAVE THESE PROBLEMS MADE IT FOR YOU TO DO YOUR WORK, TAKE CARE OF THINGS AT HOME, OR GET ALONG WITH OTHER PEOPLE: NOT DIFFICULT AT ALL

## 2023-10-04 ASSESSMENT — ACTIVITIES OF DAILY LIVING (ADL): CURRENT_FUNCTION: NO ASSISTANCE NEEDED

## 2023-10-04 ASSESSMENT — PAIN SCALES - GENERAL: PAINLEVEL: NO PAIN (0)

## 2023-10-05 LAB
ANION GAP SERPL CALCULATED.3IONS-SCNC: 10 MMOL/L (ref 7–15)
BUN SERPL-MCNC: 8.7 MG/DL (ref 8–23)
CALCIUM SERPL-MCNC: 9.2 MG/DL (ref 8.8–10.2)
CHLORIDE SERPL-SCNC: 106 MMOL/L (ref 98–107)
CHOLEST SERPL-MCNC: 207 MG/DL
CREAT SERPL-MCNC: 0.69 MG/DL (ref 0.51–0.95)
DEPRECATED HCO3 PLAS-SCNC: 25 MMOL/L (ref 22–29)
EGFRCR SERPLBLD CKD-EPI 2021: >90 ML/MIN/1.73M2
GLUCOSE SERPL-MCNC: 96 MG/DL (ref 70–99)
HDLC SERPL-MCNC: 56 MG/DL
LDLC SERPL CALC-MCNC: 122 MG/DL
NONHDLC SERPL-MCNC: 151 MG/DL
POTASSIUM SERPL-SCNC: 4.3 MMOL/L (ref 3.4–5.3)
SODIUM SERPL-SCNC: 141 MMOL/L (ref 135–145)
TRIGL SERPL-MCNC: 143 MG/DL

## 2023-10-10 DIAGNOSIS — G89.29 CHRONIC LEFT-SIDED LOW BACK PAIN WITHOUT SCIATICA: ICD-10-CM

## 2023-10-10 DIAGNOSIS — M54.50 CHRONIC LEFT-SIDED LOW BACK PAIN WITHOUT SCIATICA: ICD-10-CM

## 2023-10-10 DIAGNOSIS — F11.90 CHRONIC, CONTINUOUS USE OF OPIOIDS: ICD-10-CM

## 2023-10-12 RX ORDER — TRAMADOL HYDROCHLORIDE 50 MG/1
TABLET ORAL
Qty: 90 TABLET | Refills: 1 | Status: SHIPPED | OUTPATIENT
Start: 2023-10-12 | End: 2023-11-12

## 2023-11-08 DIAGNOSIS — F11.90 CHRONIC, CONTINUOUS USE OF OPIOIDS: ICD-10-CM

## 2023-11-08 DIAGNOSIS — G89.29 CHRONIC LEFT-SIDED LOW BACK PAIN WITHOUT SCIATICA: ICD-10-CM

## 2023-11-08 DIAGNOSIS — M54.50 CHRONIC LEFT-SIDED LOW BACK PAIN WITHOUT SCIATICA: ICD-10-CM

## 2023-11-12 RX ORDER — TRAMADOL HYDROCHLORIDE 50 MG/1
TABLET ORAL
Qty: 90 TABLET | Refills: 1 | Status: SHIPPED | OUTPATIENT
Start: 2023-11-12 | End: 2023-12-07

## 2023-12-05 DIAGNOSIS — F11.90 CHRONIC, CONTINUOUS USE OF OPIOIDS: ICD-10-CM

## 2023-12-05 DIAGNOSIS — M54.50 CHRONIC LEFT-SIDED LOW BACK PAIN WITHOUT SCIATICA: ICD-10-CM

## 2023-12-05 DIAGNOSIS — G89.29 CHRONIC LEFT-SIDED LOW BACK PAIN WITHOUT SCIATICA: ICD-10-CM

## 2023-12-07 RX ORDER — TRAMADOL HYDROCHLORIDE 50 MG/1
TABLET ORAL
Qty: 90 TABLET | Refills: 1 | Status: SHIPPED | OUTPATIENT
Start: 2023-12-07 | End: 2024-01-02

## 2024-01-01 ENCOUNTER — TELEPHONE (OUTPATIENT)
Dept: FAMILY MEDICINE | Facility: CLINIC | Age: 74
End: 2024-01-01

## 2024-01-02 DIAGNOSIS — F11.90 CHRONIC, CONTINUOUS USE OF OPIOIDS: ICD-10-CM

## 2024-01-02 DIAGNOSIS — M54.50 CHRONIC LEFT-SIDED LOW BACK PAIN WITHOUT SCIATICA: ICD-10-CM

## 2024-01-02 DIAGNOSIS — G89.29 CHRONIC LEFT-SIDED LOW BACK PAIN WITHOUT SCIATICA: ICD-10-CM

## 2024-01-02 RX ORDER — TRAMADOL HYDROCHLORIDE 50 MG/1
TABLET ORAL
Qty: 90 TABLET | Refills: 1 | Status: SHIPPED | OUTPATIENT
Start: 2024-01-02 | End: 2024-01-31

## 2024-01-30 DIAGNOSIS — G89.29 CHRONIC LEFT-SIDED LOW BACK PAIN WITHOUT SCIATICA: ICD-10-CM

## 2024-01-30 DIAGNOSIS — M54.50 CHRONIC LEFT-SIDED LOW BACK PAIN WITHOUT SCIATICA: ICD-10-CM

## 2024-01-30 DIAGNOSIS — F11.90 CHRONIC, CONTINUOUS USE OF OPIOIDS: ICD-10-CM

## 2024-01-31 RX ORDER — TRAMADOL HYDROCHLORIDE 50 MG/1
TABLET ORAL
Qty: 90 TABLET | Refills: 1 | Status: SHIPPED | OUTPATIENT
Start: 2024-01-31 | End: 2024-02-28

## 2024-02-27 DIAGNOSIS — M54.50 CHRONIC LEFT-SIDED LOW BACK PAIN WITHOUT SCIATICA: ICD-10-CM

## 2024-02-27 DIAGNOSIS — G89.29 CHRONIC LEFT-SIDED LOW BACK PAIN WITHOUT SCIATICA: ICD-10-CM

## 2024-02-27 DIAGNOSIS — F11.90 CHRONIC, CONTINUOUS USE OF OPIOIDS: ICD-10-CM

## 2024-02-28 RX ORDER — TRAMADOL HYDROCHLORIDE 50 MG/1
TABLET ORAL
Qty: 90 TABLET | Refills: 5 | Status: SHIPPED | OUTPATIENT
Start: 2024-02-28 | End: 2024-05-10

## 2024-05-10 DIAGNOSIS — F11.90 CHRONIC, CONTINUOUS USE OF OPIOIDS: ICD-10-CM

## 2024-05-10 DIAGNOSIS — M54.50 CHRONIC LEFT-SIDED LOW BACK PAIN WITHOUT SCIATICA: ICD-10-CM

## 2024-05-10 DIAGNOSIS — G89.29 CHRONIC LEFT-SIDED LOW BACK PAIN WITHOUT SCIATICA: ICD-10-CM

## 2024-05-10 RX ORDER — TRAMADOL HYDROCHLORIDE 50 MG/1
TABLET ORAL
Qty: 540 TABLET | Refills: 0 | Status: SHIPPED | OUTPATIENT
Start: 2024-05-10 | End: 2024-05-23

## 2024-05-22 DIAGNOSIS — G89.29 CHRONIC LEFT-SIDED LOW BACK PAIN WITHOUT SCIATICA: ICD-10-CM

## 2024-05-22 DIAGNOSIS — F11.90 CHRONIC, CONTINUOUS USE OF OPIOIDS: ICD-10-CM

## 2024-05-22 DIAGNOSIS — M54.50 CHRONIC LEFT-SIDED LOW BACK PAIN WITHOUT SCIATICA: ICD-10-CM

## 2024-05-23 RX ORDER — TRAMADOL HYDROCHLORIDE 50 MG/1
TABLET ORAL
Qty: 90 TABLET | Refills: 1 | Status: SHIPPED | OUTPATIENT
Start: 2024-05-23 | End: 2024-06-23

## 2024-06-23 DIAGNOSIS — M54.50 CHRONIC LEFT-SIDED LOW BACK PAIN WITHOUT SCIATICA: ICD-10-CM

## 2024-06-23 DIAGNOSIS — F11.90 CHRONIC, CONTINUOUS USE OF OPIOIDS: ICD-10-CM

## 2024-06-23 DIAGNOSIS — G89.29 CHRONIC LEFT-SIDED LOW BACK PAIN WITHOUT SCIATICA: ICD-10-CM

## 2024-06-23 RX ORDER — TRAMADOL HYDROCHLORIDE 50 MG/1
TABLET ORAL
Qty: 90 TABLET | Refills: 2 | Status: SHIPPED | OUTPATIENT
Start: 2024-06-23 | End: 2024-07-23

## 2024-07-10 ENCOUNTER — TELEPHONE (OUTPATIENT)
Dept: FAMILY MEDICINE | Facility: CLINIC | Age: 74
End: 2024-07-10
Payer: COMMERCIAL

## 2024-07-10 NOTE — TELEPHONE ENCOUNTER
Patient Quality Outreach    Patient is due for the following:   Breast Cancer Screening - Mammogram      Topic Date Due    Zoster (Shingles) Vaccine (1 of 2) Never done    Diptheria Tetanus Pertussis (DTAP/TDAP/TD) Vaccine (2 - Td or Tdap) 01/03/2022    COVID-19 Vaccine (3 - 2023-24 season) 09/01/2023     Chronic Opioid Use -  Treatment Agreement (CSA) and Urine Drug Screen    Next Steps:   Patient has upcoming appointment, these items will be addressed at that time. Appointment scheduled for 10/4/24 with PCP.    Type of outreach:    Chart review performed, no outreach needed.      Questions for provider review:    None           Laya Berger MA

## 2024-07-23 DIAGNOSIS — F11.90 CHRONIC, CONTINUOUS USE OF OPIOIDS: ICD-10-CM

## 2024-07-23 DIAGNOSIS — G89.29 CHRONIC LEFT-SIDED LOW BACK PAIN WITHOUT SCIATICA: ICD-10-CM

## 2024-07-23 DIAGNOSIS — M54.50 CHRONIC LEFT-SIDED LOW BACK PAIN WITHOUT SCIATICA: ICD-10-CM

## 2024-07-23 RX ORDER — TRAMADOL HYDROCHLORIDE 50 MG/1
50-100 TABLET ORAL 3 TIMES DAILY PRN
Qty: 90 TABLET | Refills: 1 | Status: SHIPPED | OUTPATIENT
Start: 2024-07-23 | End: 2024-07-24

## 2024-07-24 RX ORDER — TRAMADOL HYDROCHLORIDE 50 MG/1
50-100 TABLET ORAL 3 TIMES DAILY PRN
Qty: 90 TABLET | Refills: 1 | Status: SHIPPED | OUTPATIENT
Start: 2024-07-24 | End: 2024-08-06

## 2024-08-05 ENCOUNTER — E-VISIT (OUTPATIENT)
Dept: FAMILY MEDICINE | Facility: CLINIC | Age: 74
End: 2024-08-05
Payer: COMMERCIAL

## 2024-08-05 ENCOUNTER — MYC REFILL (OUTPATIENT)
Dept: FAMILY MEDICINE | Facility: CLINIC | Age: 74
End: 2024-08-05

## 2024-08-05 DIAGNOSIS — G89.29 CHRONIC LEFT-SIDED LOW BACK PAIN WITHOUT SCIATICA: ICD-10-CM

## 2024-08-05 DIAGNOSIS — M54.50 CHRONIC LEFT-SIDED LOW BACK PAIN WITHOUT SCIATICA: ICD-10-CM

## 2024-08-05 DIAGNOSIS — G89.29 CHRONIC BILATERAL LOW BACK PAIN WITHOUT SCIATICA: Primary | ICD-10-CM

## 2024-08-05 DIAGNOSIS — F11.90 CHRONIC, CONTINUOUS USE OF OPIOIDS: ICD-10-CM

## 2024-08-05 DIAGNOSIS — M54.50 CHRONIC BILATERAL LOW BACK PAIN WITHOUT SCIATICA: Primary | ICD-10-CM

## 2024-08-05 PROCEDURE — 99207 PR NON-BILLABLE SERV PER CHARTING: CPT | Performed by: INTERNAL MEDICINE

## 2024-08-06 ENCOUNTER — MYC MEDICAL ADVICE (OUTPATIENT)
Dept: FAMILY MEDICINE | Facility: CLINIC | Age: 74
End: 2024-08-06

## 2024-08-06 DIAGNOSIS — G89.29 CHRONIC LEFT-SIDED LOW BACK PAIN WITHOUT SCIATICA: ICD-10-CM

## 2024-08-06 DIAGNOSIS — F11.90 CHRONIC, CONTINUOUS USE OF OPIOIDS: ICD-10-CM

## 2024-08-06 DIAGNOSIS — M54.50 CHRONIC LEFT-SIDED LOW BACK PAIN WITHOUT SCIATICA: ICD-10-CM

## 2024-08-06 RX ORDER — TRAMADOL HYDROCHLORIDE 50 MG/1
50-100 TABLET ORAL 3 TIMES DAILY PRN
Qty: 90 TABLET | Refills: 0 | Status: SHIPPED | OUTPATIENT
Start: 2024-08-06 | End: 2024-08-09

## 2024-08-06 RX ORDER — TRAMADOL HYDROCHLORIDE 50 MG/1
50-100 TABLET ORAL 3 TIMES DAILY PRN
Qty: 90 TABLET | Refills: 1 | OUTPATIENT
Start: 2024-08-06

## 2024-08-06 NOTE — TELEPHONE ENCOUNTER
General Call      Reason for Call:  Refill request    What are your questions or concerns:  Can pt please get refills on tramadol 50mg script to get her to her appt on 8/16.     Date of last appointment with provider: Next appt on 8/16 @ 9:40    Could we send this information to you [facility] or would you prefer to receive a phone call?:   Patient would prefer a phone call   Okay to leave a detailed message?: Yes at Cell number on file:  [phone] - Daughter's phone number

## 2024-08-09 ENCOUNTER — ANCILLARY PROCEDURE (OUTPATIENT)
Dept: GENERAL RADIOLOGY | Facility: CLINIC | Age: 74
End: 2024-08-09
Attending: INTERNAL MEDICINE
Payer: COMMERCIAL

## 2024-08-09 ENCOUNTER — OFFICE VISIT (OUTPATIENT)
Dept: FAMILY MEDICINE | Facility: CLINIC | Age: 74
End: 2024-08-09
Payer: COMMERCIAL

## 2024-08-09 VITALS
RESPIRATION RATE: 18 BRPM | BODY MASS INDEX: 27.16 KG/M2 | OXYGEN SATURATION: 98 % | SYSTOLIC BLOOD PRESSURE: 131 MMHG | WEIGHT: 163 LBS | DIASTOLIC BLOOD PRESSURE: 75 MMHG | HEIGHT: 65 IN | TEMPERATURE: 98.2 F | HEART RATE: 106 BPM

## 2024-08-09 DIAGNOSIS — G89.29 CHRONIC MIDLINE LOW BACK PAIN WITHOUT SCIATICA: Primary | ICD-10-CM

## 2024-08-09 DIAGNOSIS — M25.562 LEFT KNEE PAIN, UNSPECIFIED CHRONICITY: ICD-10-CM

## 2024-08-09 DIAGNOSIS — M54.50 CHRONIC MIDLINE LOW BACK PAIN WITHOUT SCIATICA: Primary | ICD-10-CM

## 2024-08-09 DIAGNOSIS — F11.90 CHRONIC, CONTINUOUS USE OF OPIOIDS: ICD-10-CM

## 2024-08-09 DIAGNOSIS — R41.3 IMPAIRED MEMORY: ICD-10-CM

## 2024-08-09 DIAGNOSIS — F11.20 UNCOMPLICATED OPIOID DEPENDENCE (H): ICD-10-CM

## 2024-08-09 LAB
CREAT UR-MCNC: 140 MG/DL
TSH SERPL DL<=0.005 MIU/L-ACNC: 0.59 UIU/ML (ref 0.3–4.2)
VIT B12 SERPL-MCNC: 295 PG/ML (ref 232–1245)

## 2024-08-09 PROCEDURE — 72100 X-RAY EXAM L-S SPINE 2/3 VWS: CPT | Mod: TC | Performed by: RADIOLOGY

## 2024-08-09 PROCEDURE — G0481 DRUG TEST DEF 8-14 CLASSES: HCPCS | Performed by: INTERNAL MEDICINE

## 2024-08-09 PROCEDURE — 36415 COLL VENOUS BLD VENIPUNCTURE: CPT | Performed by: INTERNAL MEDICINE

## 2024-08-09 PROCEDURE — 82607 VITAMIN B-12: CPT | Performed by: INTERNAL MEDICINE

## 2024-08-09 PROCEDURE — 99207 PR NO CHARGE LOS: CPT | Performed by: INTERNAL MEDICINE

## 2024-08-09 PROCEDURE — 84443 ASSAY THYROID STIM HORMONE: CPT | Performed by: INTERNAL MEDICINE

## 2024-08-09 PROCEDURE — 73562 X-RAY EXAM OF KNEE 3: CPT | Mod: TC | Performed by: RADIOLOGY

## 2024-08-09 RX ORDER — TRAMADOL HYDROCHLORIDE 50 MG/1
50-100 TABLET ORAL 3 TIMES DAILY PRN
Qty: 180 TABLET | Refills: 5 | Status: SHIPPED | OUTPATIENT
Start: 2024-08-20

## 2024-08-09 ASSESSMENT — PATIENT HEALTH QUESTIONNAIRE - PHQ9
SUM OF ALL RESPONSES TO PHQ QUESTIONS 1-9: 0
10. IF YOU CHECKED OFF ANY PROBLEMS, HOW DIFFICULT HAVE THESE PROBLEMS MADE IT FOR YOU TO DO YOUR WORK, TAKE CARE OF THINGS AT HOME, OR GET ALONG WITH OTHER PEOPLE: NOT DIFFICULT AT ALL
SUM OF ALL RESPONSES TO PHQ QUESTIONS 1-9: 0

## 2024-08-09 ASSESSMENT — PAIN SCALES - GENERAL: PAINLEVEL: NO PAIN (0)

## 2024-08-09 NOTE — LETTER

## 2024-08-09 NOTE — PROGRESS NOTES
Skinny Salmeron is a 74 year old, presenting for the following health issues:  No chief complaint on file.    History of Present Illness       Reason for visit:  Med refills    She eats 0-1 servings of fruits and vegetables daily.She consumes 3 sweetened beverage(s) daily.She exercises with enough effort to increase her heart rate 9 or less minutes per day.  She exercises with enough effort to increase her heart rate 3 or less days per week.   She is taking medications regularly.     Pain History:  When did you first notice your pain? 20 years ado    Have you seen this provider for your pain in the past? Yes   Where in your body do you have pain? Low back pain   Are you seeing anyone else for your pain? No        11/26/2019     5:04 PM 10/4/2023     1:04 PM 8/9/2024     3:06 PM   PHQ-9 SCORE   PHQ-9 Total Score MyChart  0 0   PHQ-9 Total Score 2 0 0           9/6/2016     7:06 PM 11/26/2019     5:04 PM 10/4/2023     1:05 PM   ASHLEE-7 SCORE   Total Score   0 (minimal anxiety)   Total Score 0 0 0           8/9/2024     3:22 PM   PEG Score   PEG Total Score 1.67

## 2024-08-13 LAB
N-NORTRAMADOL/CREAT UR CFM: ABNORMAL NG/MG{CREAT}
O-NORTRAMADOL UR CFM-MCNC: ABNORMAL NG/ML
TRAMADOL CTO UR CFM-MCNC: ABNORMAL NG/ML
TRAMADOL/CREAT UR: ABNORMAL

## 2024-08-16 ENCOUNTER — E-VISIT (OUTPATIENT)
Dept: FAMILY MEDICINE | Facility: CLINIC | Age: 74
End: 2024-08-16
Payer: COMMERCIAL

## 2024-08-16 DIAGNOSIS — M54.50 CHRONIC MIDLINE LOW BACK PAIN WITHOUT SCIATICA: Primary | ICD-10-CM

## 2024-08-16 DIAGNOSIS — G89.29 CHRONIC MIDLINE LOW BACK PAIN WITHOUT SCIATICA: Primary | ICD-10-CM

## 2024-08-16 PROCEDURE — 99207 PR NO CHARGE LOS: CPT | Performed by: FAMILY MEDICINE

## 2024-11-05 ENCOUNTER — TELEPHONE (OUTPATIENT)
Dept: FAMILY MEDICINE | Facility: CLINIC | Age: 74
End: 2024-11-05
Payer: COMMERCIAL

## 2024-11-05 DIAGNOSIS — G89.29 CHRONIC MIDLINE LOW BACK PAIN WITHOUT SCIATICA: ICD-10-CM

## 2024-11-05 DIAGNOSIS — M54.50 CHRONIC MIDLINE LOW BACK PAIN WITHOUT SCIATICA: ICD-10-CM

## 2024-11-05 DIAGNOSIS — F11.90 CHRONIC, CONTINUOUS USE OF OPIOIDS: ICD-10-CM

## 2024-11-05 RX ORDER — TRAMADOL HYDROCHLORIDE 50 MG/1
50-100 TABLET ORAL 3 TIMES DAILY PRN
Qty: 180 TABLET | Refills: 5 | Status: CANCELLED | OUTPATIENT
Start: 2024-11-05

## 2024-11-05 NOTE — TELEPHONE ENCOUNTER
Daughter and patient were on vacation and lost the Tramadol prescription.  They cannot find it anywhere.      Per the daughter, the patient is not due for a refill until 11/16.      Provider: Please trini and early refill of Tramadol or a small refill until their regular refill until 11/16.     Original script for Tramadol pended.      Kristina Kjellberg, MSN, RN

## 2024-11-06 RX ORDER — TRAMADOL HYDROCHLORIDE 200 MG/1
200 TABLET, EXTENDED RELEASE ORAL
Qty: 10 TABLET | Refills: 0 | Status: SHIPPED | OUTPATIENT
Start: 2024-11-06 | End: 2024-11-06

## 2024-11-06 RX ORDER — TRAMADOL HYDROCHLORIDE 200 MG/1
200 TABLET, EXTENDED RELEASE ORAL
Qty: 10 TABLET | Refills: 0 | Status: SHIPPED | OUTPATIENT
Start: 2024-11-06 | End: 2024-11-15

## 2024-11-06 NOTE — TELEPHONE ENCOUNTER
Outpatient Medication Detail     Disp Refills Start End NICHOLAS   traMADol (ULTRAM-ER) 200 MG 24 hr tablet 10 tablet 0 11/6/2024 -- No   Sig - Route: Take 1 tablet (200 mg) by mouth nightly as needed for severe pain. - Oral   Sent to pharmacy as: traMADol HCl  MG Oral Tablet Extended Release 24 Hour (ULTRAM-ER)   Class: E-Prescribe   Order: [id number]   E-Prescribing Status: Receipt confirmed by pharmacy (11/6/2024  3:15 PM CST)

## 2024-11-06 NOTE — TELEPHONE ENCOUNTER
Daughter calling to say that the [facility] in [place] does not have the supply of the Tramodol and   Needs this sent to [facility].  Radha Dan MA  [facility]   Primary Care

## 2024-11-06 NOTE — TELEPHONE ENCOUNTER
Daughter, Clari, is calling to check on refill request. Routing high priority to provider as patient is out of prescription. Please advise. Marcell Balderas RN, BSN

## 2024-11-06 NOTE — TELEPHONE ENCOUNTER
Outpatient Medication Detail     Disp Refills Start End NICHOLAS   traMADol (ULTRAM-ER) 200 MG 24 hr tablet 10 tablet 0 11/6/2024 -- No   Sig - Route: Take 1 tablet (200 mg) by mouth nightly as needed for severe pain. - Oral   Sent to pharmacy as: traMADol HCl  MG Oral Tablet Extended Release 24 Hour (ULTRAM-ER)   Class: E-Prescribe   Order: [id number]   E-Prescribing Status: Receipt confirmed by pharmacy (11/6/2024  1:28 PM CST)

## 2024-11-06 NOTE — TELEPHONE ENCOUNTER
Patient updated on the below.    Patient is requesting Ultram be send to the pharmacy.    Lucille García RN

## 2024-11-06 NOTE — TELEPHONE ENCOUNTER
Unfortunately, any lost prescription cannot be replacement. Hence, early refill request for the same drug is denied. This is stated in the controlled substance agreement signed last August 9, 2024.    To avoid withdrawal, I sent Rx for Ultram  mg.    If Ultram ER is not covered, then patient should pay out-of-pocket for it.

## 2024-11-07 ENCOUNTER — TELEPHONE (OUTPATIENT)
Dept: FAMILY MEDICINE | Facility: CLINIC | Age: 74
End: 2024-11-07

## 2024-11-07 ENCOUNTER — TELEPHONE (OUTPATIENT)
Dept: NURSING | Facility: CLINIC | Age: 74
End: 2024-11-07
Payer: COMMERCIAL

## 2024-11-07 ENCOUNTER — LAB (OUTPATIENT)
Dept: LAB | Facility: CLINIC | Age: 74
End: 2024-11-07
Payer: COMMERCIAL

## 2024-11-07 DIAGNOSIS — F11.20 CONTINUOUS OPIOID DEPENDENCE (H): Primary | ICD-10-CM

## 2024-11-07 DIAGNOSIS — F11.20 CONTINUOUS OPIOID DEPENDENCE (H): ICD-10-CM

## 2024-11-07 LAB
Lab: NORMAL
PERFORMING LABORATORY: NORMAL
SPECIMEN STATUS: NORMAL
TEST NAME: NORMAL

## 2024-11-07 PROCEDURE — G0481 DRUG TEST DEF 8-14 CLASSES: HCPCS

## 2024-11-07 NOTE — TELEPHONE ENCOUNTER
This RN spoke with dtr Sabrina and patient regarding other concerns (see future 11/7/24 telephone encounter).    This RN spoke with patient regarding her pain medication and asked patient if she wanted to schedule an appointment to discuss any concerns or questions, pt declined. She states that her medication was working fine and she had no questions/concerns at this time.      The plan at the end of the call was for patient to go to Urgent Care for a drug screening per her dtr Sabrina's request. Pt agreed to this plan.     Ronda Parker RN    [facility]

## 2024-11-07 NOTE — TELEPHONE ENCOUNTER
Nurse Triage SBAR    Is this a 2nd Level Triage? NO    Situation: Patient's daughter Clari calling with the patient giving verbal consent to communicate, reporting the patient was still having pain. Reports she was taking the Tramadol 50 MG tablets but lost the prescription and has now been prescribed Tramadol  MG 24 hour tabs. Reports the patient is in severe pain and the Tramadol ER has not been helping. Tried Ibuprofen with little relief. Declined triaging symptoms, wanted medication input only. Advised against taking additional Tramadol 24 hour tabs as this would be taking differently than prescribed. Patient requesting provider input regarding pain management. Routing to provider to address.     Background: Patient prescribed Tramadol but lost prescription, not due for refill until 11/16.  History of back pain    Assessment: Current medication not helping with pain.     Protocol Recommended Disposition:   No disposition on file.    Recommendation: Provider input regarding pain management     Routed to provider    Does the patient meet one of the following criteria for ADS visit consideration? 16+ years old, with an [facility] PCP         Jacinda Abdalla RN 11/07/24 2:01 AM   [facility] Triage Nurse Advisor

## 2024-11-07 NOTE — TELEPHONE ENCOUNTER
"RN called maria l Sabrina back, she was not with patient. She was able to connect patient via 3 way. Pt gives verbal permission to speak with Sabrina regarding any medication information and scheduling appointment.     RN asked pt how is she taking her medication. Pt states that she takes Tramadol 2 tablets daily for 3 times per day. She states she has no concerns or question in regards with her medication   She also reports that she did lose meds recently on vacation. She states that she has not picked up the prescription prescribed yesterday. Patient dtr Sabrina then stated that she did pick-up the meds and patient states, \"I guess I did\".     So RN asked pt if she wanted to schedule an appointment to discuss further with Dr. Russell and pt declined. She said an appointment was not needed. Then dtr Sabrina talked to the patient that she needs an appointment as she is concerned. Dtr wants her to be seen today. RN stated there are no appointment with Dr. Russell. Dtr then stated she will bring her to urgent care. Dtr Sabrina asked patient if she was in agreement with this plan, pt verbalized understanding and she agreed with her dtr to go to urgent care.     RN let them know that will send message to Dr. Russell as an update. No further action needed from RN team.     RODRIGO Chan [employer]            --------------------------------------------------------------------------------------------------------------------------------    Maria L Clari called on 11/5 stating that medication was lost on vacation. Pt was then prescribed a new dose by Dr. Russell on 11/5 to prevent withdrawal.   Maria L Bennett is now calling with some concerns.     No CTC on file to speak with any family members/daughters. Will need to speak with patient first.         RODRIGO Chan [employer]        "

## 2024-11-07 NOTE — TELEPHONE ENCOUNTER
Reason for Call:  Appointment Request    Patient requesting this type of appt: Chronic Diease Management/Medication/Follow-Up    Requested provider: [facility]: Lazarus Russell MD     Reason patient unable to be scheduled: Not within requested timeframe    When does patient want to be seen/preferred time: Same day    Comments: Daughter called concerned that her mom was short like 65 pills, so not sure if her mom is actually taking them or sister is taking them. Dr Russell prescribed a high dosage, 200 mg, yesterday on top of the ones she has. Daughter is concerned with the new medication level. She wants her mom to get tested to if she is really taking the pills or if her sister is taking them. Her daughter does not have a consent to communicate on file, but will be with her mom when you call. He mom will not answer her phone.    Could we send this information to you in [place] or would you prefer to receive a phone call?:   Patient would prefer a phone call   Okay to leave a detailed message?: Yes at Cell number on file:   Telephone Information:   Mobile [phone]           Call taken on 11/7/2024 at 8:18 AM by Jo Ann Nieto

## 2024-11-07 NOTE — TELEPHONE ENCOUNTER
I spoke with patient's daughter, Faviola Bennett is scheduled for urine drug screen today and appointment with this provider on November 15, 2024.

## 2024-11-08 ENCOUNTER — TELEPHONE (OUTPATIENT)
Dept: FAMILY MEDICINE | Facility: CLINIC | Age: 74
End: 2024-11-08
Payer: COMMERCIAL

## 2024-11-08 LAB — CREAT UR-MCNC: 51 MG/DL

## 2024-11-08 NOTE — TELEPHONE ENCOUNTER
PA Initiation    Medication: TRAMADOL HCL  MG PO TB24  Insurance Company: [employer] - Phone [phone] Fax [phone]  Pharmacy Filling the Rx:    Filling Pharmacy Phone:    Filling Pharmacy Fax:    Start Date: 11/8/2024

## 2024-11-08 NOTE — TELEPHONE ENCOUNTER
Per chart review, labs are still in process. Per review of lab guide, results can take between 1-6 days to result. RN relayed this information to pt's daughter, Sabrina (CTC on file), who verbalized understanding.     Sabrina asking why 8/9 screening results was so high. Asking if this is would indicate that pt is taking appropriately or if she's taking too much. No result note in chart.     Sabrina stated that pt can take 6 tablets per day, but has been running out early (10 days last month and 7 days this month). Sabrina is not sure if pt is running out early because she is taking too much or if other daughter is taking pills and selling them. Reports that pt has also been asking other residents of their apt building for tramadol and purchasing from them since she is out of medication. Sabrina would like to purchase locked medication box to make sure that pt is only taking max of 6 per day.     Routing to provider to advise on lab results from 8/9 and most recent labs when results are finalized.     Zahra Wing RN

## 2024-11-08 NOTE — TELEPHONE ENCOUNTER
Test Results    Contacts       Contact Date/Time Type Contact Phone/Fax    11/08/2024 10:22 AM CST Phone (Incoming) Sabrina Bolivar (Emergency Contact) [phone] (M)            Who ordered the test:  Lazarus Russell    Type of test: Lab-Urine    Date of test:  11/7/2024    Where was the test performed:  [facility]    What are your questions/concerns?:  Pts daughter is looking to get the results from the test done yesterday. Please contact.      Could we send this information to you in [employer] or would you prefer to receive a phone call?:   Patient would prefer a phone call   Okay to leave a detailed message?: Yes at Other phone number:  [phone]

## 2024-11-11 LAB
DHC UR CFM-MCNC: 54 NG/ML
DHC/CREAT UR: 106 NG/MG {CREAT}
HYDROCODONE UR CFM-MCNC: 70 NG/ML
HYDROCODONE/CREAT UR: 137 NG/MG {CREAT}
MISCELLANEOUS TEST 1 (ARUP): NORMAL
N-NORTRAMADOL/CREAT UR CFM: ABNORMAL NG/MG{CREAT}
O-NORTRAMADOL UR CFM-MCNC: ABNORMAL NG/ML
TRAMADOL CTO UR CFM-MCNC: ABNORMAL NG/ML
TRAMADOL/CREAT UR: ABNORMAL

## 2024-11-11 NOTE — TELEPHONE ENCOUNTER
Not seeing any evidence in patient profile that she has tried/failed any of the formulary alternatives listed below.     Please advise.

## 2024-11-15 ENCOUNTER — OFFICE VISIT (OUTPATIENT)
Dept: FAMILY MEDICINE | Facility: CLINIC | Age: 74
End: 2024-11-15
Payer: COMMERCIAL

## 2024-11-15 VITALS
BODY MASS INDEX: 26.66 KG/M2 | HEART RATE: 96 BPM | TEMPERATURE: 97.4 F | OXYGEN SATURATION: 99 % | WEIGHT: 160 LBS | HEIGHT: 65 IN | DIASTOLIC BLOOD PRESSURE: 74 MMHG | RESPIRATION RATE: 20 BRPM | SYSTOLIC BLOOD PRESSURE: 127 MMHG

## 2024-11-15 DIAGNOSIS — G89.4 CHRONIC PAIN SYNDROME: ICD-10-CM

## 2024-11-15 DIAGNOSIS — F03.90 DEMENTIA WITHOUT BEHAVIORAL DISTURBANCE (H): ICD-10-CM

## 2024-11-15 DIAGNOSIS — G89.29 CHRONIC BILATERAL LOW BACK PAIN WITHOUT SCIATICA: ICD-10-CM

## 2024-11-15 DIAGNOSIS — F11.90 CHRONIC, CONTINUOUS USE OF OPIOIDS: ICD-10-CM

## 2024-11-15 DIAGNOSIS — M47.817 LUMBOSACRAL SPONDYLOSIS WITHOUT MYELOPATHY: Primary | ICD-10-CM

## 2024-11-15 DIAGNOSIS — M54.50 CHRONIC MIDLINE LOW BACK PAIN WITHOUT SCIATICA: ICD-10-CM

## 2024-11-15 DIAGNOSIS — M47.816 LUMBAR FACET ARTHROPATHY: ICD-10-CM

## 2024-11-15 DIAGNOSIS — G89.29 CHRONIC MIDLINE LOW BACK PAIN WITHOUT SCIATICA: ICD-10-CM

## 2024-11-15 DIAGNOSIS — M17.12 PRIMARY OSTEOARTHRITIS OF LEFT KNEE: ICD-10-CM

## 2024-11-15 DIAGNOSIS — M54.50 CHRONIC BILATERAL LOW BACK PAIN WITHOUT SCIATICA: ICD-10-CM

## 2024-11-15 PROCEDURE — G2211 COMPLEX E/M VISIT ADD ON: HCPCS | Performed by: INTERNAL MEDICINE

## 2024-11-15 PROCEDURE — 99214 OFFICE O/P EST MOD 30 MIN: CPT | Performed by: INTERNAL MEDICINE

## 2024-11-15 RX ORDER — TRAMADOL HYDROCHLORIDE 50 MG/1
100 TABLET ORAL 2 TIMES DAILY
Qty: 120 TABLET | Refills: 0 | Status: SHIPPED | OUTPATIENT
Start: 2024-11-16

## 2024-11-15 RX ORDER — ACETAMINOPHEN 500 MG
1000 TABLET ORAL
Qty: 60 TABLET | Refills: 0 | Status: SHIPPED | OUTPATIENT
Start: 2024-11-15

## 2024-11-15 RX ORDER — LANOLIN ALCOHOL/MO/W.PET/CERES
1000 CREAM (GRAM) TOPICAL DAILY
Qty: 100 TABLET | Refills: 3 | Status: SHIPPED | OUTPATIENT
Start: 2024-11-15

## 2024-11-15 ASSESSMENT — ANXIETY QUESTIONNAIRES
3. WORRYING TOO MUCH ABOUT DIFFERENT THINGS: NOT AT ALL
6. BECOMING EASILY ANNOYED OR IRRITABLE: NOT AT ALL
4. TROUBLE RELAXING: NOT AT ALL
GAD7 TOTAL SCORE: 0
8. IF YOU CHECKED OFF ANY PROBLEMS, HOW DIFFICULT HAVE THESE MADE IT FOR YOU TO DO YOUR WORK, TAKE CARE OF THINGS AT HOME, OR GET ALONG WITH OTHER PEOPLE?: NOT DIFFICULT AT ALL
1. FEELING NERVOUS, ANXIOUS, OR ON EDGE: NOT AT ALL
IF YOU CHECKED OFF ANY PROBLEMS ON THIS QUESTIONNAIRE, HOW DIFFICULT HAVE THESE PROBLEMS MADE IT FOR YOU TO DO YOUR WORK, TAKE CARE OF THINGS AT HOME, OR GET ALONG WITH OTHER PEOPLE: NOT DIFFICULT AT ALL
GAD7 TOTAL SCORE: 0
2. NOT BEING ABLE TO STOP OR CONTROL WORRYING: NOT AT ALL
5. BEING SO RESTLESS THAT IT IS HARD TO SIT STILL: NOT AT ALL
GAD7 TOTAL SCORE: 0
7. FEELING AFRAID AS IF SOMETHING AWFUL MIGHT HAPPEN: NOT AT ALL
7. FEELING AFRAID AS IF SOMETHING AWFUL MIGHT HAPPEN: NOT AT ALL

## 2024-11-15 ASSESSMENT — PAIN SCALES - GENERAL: PAINLEVEL_OUTOF10: NO PAIN (0)

## 2024-11-15 NOTE — PATIENT INSTRUCTIONS
At [facility], we strive to deliver an exceptional experience to you, every time we see you. If you receive a survey, please let us know what we are doing well and/or what we could improve upon, as we do value your feedback.  If you have MyChart, you can expect to receive results automatically within 24 hours of their completion.  Your provider will send a note interpreting your results as well.   If you do not have MyChart, you should receive your results in about a week by mail.    Your care team:                            Family Medicine Internal Medicine   MD Lazarus Bowles, MD Laura Jules, MD Gray Prasad, MD Carolyne Tyler, PAEstelaC    Prince Bucio, MD Pediatrics   Sherly Mcgraw, MD Nithya Winters, MD Ronda Lea, APRN CNP Cony Paulino APRN CNP   MD Leigha Cesar, MD Maryul Collazo, CNP     Bert Goodwin, CNP Same-Day Provider (No follow-up visits)   NANO Huff, DNP Monse Contreras, NANO Eldridge, FNP, BC RICHY RomanoC     Clinic hours: Monday - Thursday 7 am-6 pm; Fridays 7 am-5 pm.   Urgent care: Monday - Friday 10 am- 8 pm; Saturday and Sunday 9 am-5 pm.    Clinic: [phone]       [facility]: Monday - Thursday 8 am - 7 pm; Friday 8 am - 6 pm  [facility]: [phone]

## 2024-11-15 NOTE — PROGRESS NOTES
{PROVIDER CHARTING PREFERENCE:106255}    Skinny Salmeron is a 74 year old, presenting for the following health issues:  Pain  {(!) Visit Details have not yet been documented.  Please enter Visit Details and then use this list to pull in documentation. (Optional):305750}  Pain    History of Present Illness       Back Pain:  She presents for follow up of back pain. Patient's back pain is a chronic problem.  Location of back pain:  Right lower back and left lower back  Description of back pain: dull ache  Back pain spreads: nowhere    Since patient first noticed back pain, pain is: always present, but gets better and worse  Does back pain interfere with her job:  Not applicable       She eats 0-1 servings of fruits and vegetables daily.She consumes 2 sweetened beverage(s) daily.She exercises with enough effort to increase her heart rate 9 or less minutes per day.  She exercises with enough effort to increase her heart rate 3 or less days per week.   She is taking medications regularly.       {MA/LPN/RN Pre-Provider Visit Orders- hCG/UA/Strep (Optional):721050}  {SUPERLIST (Optional):930626}  {additonal problems for provider to add (Optional):887530}    {ROS Picklists (Optional):910696}      Objective    LMP  (LMP Unknown)   There is no height or weight on file to calculate BMI.  Physical Exam   {Exam List (Optional):758408}    {Diagnostic Test Results (Optional):937562}        Signed Electronically by: Lazarus Russell MD  {Email feedback regarding this note to [email or web address]   :718939}   10/12/2015    Procedure: COLONOSCOPY;  Surgeon: mEeka Tejeda MD;  Location: [facility] OR    COLONOSCOPY WITH CO2 INSUFFLATION N/A 10/12/2015    Procedure: COLONOSCOPY WITH CO2 INSUFFLATION;  Surgeon: Emeka Tejeda MD;  Location: [facility] OR    D & C      HC REMOVAL GALLBLADDER      ZZC NONSPECIFIC PROCEDURE  2002       Social History     Tobacco Use    Smoking status: Never     Passive exposure: Never    Smokeless tobacco: Never   Substance Use Topics    Alcohol use: Yes     Comment: rarely     Family History   Problem Relation Age of Onset    Breast Cancer Mother     Cancer Mother         cervical         No Known Allergies  Recent Labs   Lab Test 08/09/24  1636 10/04/23  1403   LDL  --  122*   HDL  --  56   TRIG  --  143   CR  --  0.69   GFRESTIMATED  --  >90   POTASSIUM  --  4.3   TSH 0.59  --       BP Readings from Last 3 Encounters:   11/15/24 127/74   08/09/24 131/75   10/04/23 103/66    Wt Readings from Last 3 Encounters:   11/15/24 72.6 kg (160 lb)   08/09/24 73.9 kg (163 lb)   10/04/23 72.1 kg (159 lb)        ROS:  C: NEGATIVE for fever, chills, change in weight  I: NEGATIVE for worrisome rashes, moles or lesions  E: NEGATIVE for vision changes or irritation  E/M: NEGATIVE for ear, mouth and throat problems  R: NEGATIVE for significant cough or SOB  B: NEGATIVE for masses, tenderness or discharge  CV: NEGATIVE for chest pain, palpitations or peripheral edema  GI: NEGATIVE for nausea, abdominal pain, heartburn, or change in bowel habits  : NEGATIVE for frequency, dysuria, or hematuria  M: As above.  N: NEGATIVE for weakness, dizziness or paresthesias  E: NEGATIVE for temperature intolerance, skin/hair changes  H: NEGATIVE for bleeding problems  P: NEGATIVE for changes in mood or affect    OBJECTIVE:                                                    /74 (BP Location: Left arm, Patient Position: Chair, Cuff Size: Adult Regular)   Pulse 96   Temp 97.4  F (36.3  C) (Temporal)   Resp 20   Ht  "1.651 m (5' 5\")   Wt 72.6 kg (160 lb)   LMP  (LMP Unknown)   SpO2 99%   BMI 26.63 kg/m    Body mass index is 26.63 kg/m .  GENERAL: alert and no distress  EYES: Eyes grossly normal to inspection and conjunctivae and sclerae normal  HENT: normal cephalic/atraumatic and oral mucous membranes moist  NECK: no adenopathy and thyroid normal to palpation  RESP: lungs clear to auscultation - no rales, rhonchi or wheezes  CV: regular rate and rhythm, normal S1 S2, no S3 or S4, no murmur, click or rub, no peripheral edema  ABDOMEN: soft, nontender, no hepatosplenomegaly, no masses and bowel sounds normal  MS: Reduced active range of motion at low back. Crepitus noted on left knee  NEURO: Normal strength and tone, sensory exam grossly normal, and impaired memory.  PSYCH: mentation appears normal, affect normal/bright    Diagnostic Test Results:  No results found for any visits on 11/15/24.     ASSESSMENT/PLAN:                                                      Lumbosacral spondylosis without myelopathy  - MR Lumbar Spine w/o Contrast; Future  - acetaminophen (TYLENOL) 500 MG tablet; Take 2 tablets (1,000 mg) by mouth daily (with lunch).    Lumbar facet arthropathy  - MR Lumbar Spine w/o Contrast; Future  - Pain Management  Referral; Future  - acetaminophen (TYLENOL) 500 MG tablet; Take 2 tablets (1,000 mg) by mouth daily (with lunch).    Chronic bilateral low back pain without sciatica  - MR Lumbar Spine w/o Contrast; Future  - traMADol (ULTRAM) 50 MG tablet; Take 2 tablets (100 mg) by mouth 2 times daily.    Primary osteoarthritis of left knee  - MR Knee Left w/o Contrast; Future  - acetaminophen (TYLENOL) 500 MG tablet; Take 2 tablets (1,000 mg) by mouth daily (with lunch).    Chronic pain syndrome  - traMADol (ULTRAM) 50 MG tablet; Take 2 tablets (100 mg) by mouth 2 times daily.    Dementia without behavioral disturbance (H)  - CT Head w/o Contrast; Future  - cyanocobalamin (VITAMIN B-12) 1000 MCG tablet; " Take 1 tablet (1,000 mcg) by mouth daily.  - Adult Neuropsychology  Referral; Future      Follow-up in 4 weeks.    Lazarus Russell MD  [facility]

## 2024-11-16 ENCOUNTER — HEALTH MAINTENANCE LETTER (OUTPATIENT)
Age: 74
End: 2024-11-16

## 2024-11-29 ENCOUNTER — ANCILLARY PROCEDURE (OUTPATIENT)
Dept: CT IMAGING | Facility: CLINIC | Age: 74
End: 2024-11-29
Attending: INTERNAL MEDICINE
Payer: COMMERCIAL

## 2024-11-29 DIAGNOSIS — F03.90 DEMENTIA WITHOUT BEHAVIORAL DISTURBANCE (H): ICD-10-CM

## 2024-11-29 PROCEDURE — 70450 CT HEAD/BRAIN W/O DYE: CPT | Mod: TC | Performed by: RADIOLOGY

## 2024-12-03 ENCOUNTER — ANCILLARY PROCEDURE (OUTPATIENT)
Dept: MRI IMAGING | Facility: CLINIC | Age: 74
End: 2024-12-03
Attending: INTERNAL MEDICINE
Payer: COMMERCIAL

## 2024-12-03 DIAGNOSIS — M47.817 LUMBOSACRAL SPONDYLOSIS WITHOUT MYELOPATHY: ICD-10-CM

## 2024-12-03 DIAGNOSIS — M54.50 CHRONIC BILATERAL LOW BACK PAIN WITHOUT SCIATICA: ICD-10-CM

## 2024-12-03 DIAGNOSIS — M47.816 LUMBAR FACET ARTHROPATHY: ICD-10-CM

## 2024-12-03 DIAGNOSIS — G89.29 CHRONIC BILATERAL LOW BACK PAIN WITHOUT SCIATICA: ICD-10-CM

## 2024-12-03 PROCEDURE — 72148 MRI LUMBAR SPINE W/O DYE: CPT | Mod: TC | Performed by: RADIOLOGY

## 2024-12-05 ENCOUNTER — OFFICE VISIT (OUTPATIENT)
Dept: FAMILY MEDICINE | Facility: CLINIC | Age: 74
End: 2024-12-05
Payer: COMMERCIAL

## 2024-12-05 VITALS
DIASTOLIC BLOOD PRESSURE: 83 MMHG | HEIGHT: 65 IN | OXYGEN SATURATION: 98 % | BODY MASS INDEX: 27.12 KG/M2 | WEIGHT: 162.8 LBS | SYSTOLIC BLOOD PRESSURE: 139 MMHG | HEART RATE: 90 BPM | RESPIRATION RATE: 16 BRPM | TEMPERATURE: 98.2 F

## 2024-12-05 DIAGNOSIS — G89.4 CHRONIC PAIN SYNDROME: ICD-10-CM

## 2024-12-05 DIAGNOSIS — G89.29 CHRONIC BILATERAL LOW BACK PAIN WITHOUT SCIATICA: ICD-10-CM

## 2024-12-05 DIAGNOSIS — M54.50 CHRONIC BILATERAL LOW BACK PAIN WITHOUT SCIATICA: ICD-10-CM

## 2024-12-05 DIAGNOSIS — F03.90 DEMENTIA WITHOUT BEHAVIORAL DISTURBANCE (H): Primary | ICD-10-CM

## 2024-12-05 DIAGNOSIS — M47.816 LUMBAR FACET ARTHROPATHY: ICD-10-CM

## 2024-12-05 PROCEDURE — 96127 BRIEF EMOTIONAL/BEHAV ASSMT: CPT | Performed by: INTERNAL MEDICINE

## 2024-12-05 PROCEDURE — G2211 COMPLEX E/M VISIT ADD ON: HCPCS | Performed by: INTERNAL MEDICINE

## 2024-12-05 PROCEDURE — 99213 OFFICE O/P EST LOW 20 MIN: CPT | Performed by: INTERNAL MEDICINE

## 2024-12-05 RX ORDER — TRAMADOL HYDROCHLORIDE 50 MG/1
100 TABLET ORAL 2 TIMES DAILY
Qty: 120 TABLET | Refills: 5 | Status: SHIPPED | OUTPATIENT
Start: 2024-12-10 | End: 2024-12-10

## 2024-12-05 RX ORDER — TRAMADOL HYDROCHLORIDE 50 MG/1
100 TABLET ORAL 2 TIMES DAILY
Qty: 120 TABLET | Refills: 0 | Status: SHIPPED | OUTPATIENT
Start: 2024-12-10 | End: 2024-12-05

## 2024-12-05 NOTE — PROGRESS NOTES
[facility] Internal Medicine Progress Note           Assessment and Plan:     Dementia without behavioral disturbance (H)  - PET Brain Amyloid; Future    Lumbar facet arthropathy, L4-L5  - Pain Management  Referral; Future          Interval History:     Reason for visit: cognitive issues  Onset: chronic  Description: short-term memory loss  Course: worse  Associated symptoms: no behavioral changes  History: yes  Evaluation: CT of head shows diffuse cerebral volume loss with small vessel ischemic disease  Precipitating factor: atherosclerosis  Alleviating factor: none  Complications: opioid dependence. Patient is reportedly taking more than expected doses of Tramadol.  Therapies tried and outcome: None     Back pain  Duration of complaint: chronic   Specific cause: overuse  Description:   Location of pain: low back   Character of pain: dull ache  Pain radiation:none  Intensity: mild, moderate  Accompanying Signs & Symptoms:  Fever: no   Numbness or weakness in legs:  no   Dysuria or Hematuria: no   Bowel or bladder incontinence: no   History:   Any injury (lifting, bending, twisting): no   Work Injury: no  History of back problems: previous degenerative joint disease of the lumbar spine  Any previous MRI or X-rays: Yes.  Any history of back surgery: no   Any cancer history: no   Precipitating factors:   Worsened by: Lifting and Bending.  Alleviating factors:  Improved by: none  Therapies Tried and outcome: Tramadol is modestly effective.          Significant Problems:     Patient Active Problem List   Diagnosis    Restless legs    Osteopenia    Fibromyalgia syndrome    Moderate major depression (H)    Restless leg syndrome    Hyperlipidemia LDL goal <130    Overweight (BMI 25.0-29.9)    Vitamin D deficiency    Diverticulosis of large intestine    Primary osteoarthritis of left knee    DDD (degenerative disc disease), lumbar    Uncomplicated opioid dependence (H)    Chronic midline low back pain  "without sciatica    Major depression in remission (H)    Herpes zoster ophthalmicus, od              Review of Systems:     CONSTITUTIONAL: NEGATIVE for fever, chills, change in weight  INTEGUMENTARY/SKIN: NEGATIVE for worrisome rashes, moles or lesions  EYES: NEGATIVE for vision changes or irritation  ENT/MOUTH: NEGATIVE for ear, mouth and throat problems  RESP: NEGATIVE for significant cough or SOB  BREAST: NEGATIVE for masses, tenderness or discharge  CV: NEGATIVE for chest pain, palpitations or peripheral edema  GI: NEGATIVE for nausea, abdominal pain, heartburn, or change in bowel habits  : NEGATIVE for frequency, dysuria, or hematuria  NEURO: NEGATIVE for HX CVA and HX TIA  ENDOCRINE: NEGATIVE for temperature intolerance, skin/hair changes  HEME: NEGATIVE for bleeding problems  PSYCHIATRIC: NEGATIVE for changes in mood or affect             Physical Exam:   /83 (BP Location: Right arm, Patient Position: Sitting, Cuff Size: Adult Large)   Pulse 90   Temp 98.2  F (36.8  C) (Temporal)   Resp 16   Ht 1.651 m (5' 5\")   Wt 73.8 kg (162 lb 12.8 oz)   LMP  (LMP Unknown)   SpO2 98%   BMI 27.09 kg/m     Constitutional:   fatigued, alert, cooperative, no apparent distress, and appears stated age     Eyes:   extra-ocular muscles intact and sclera clear     ENT:   normocephalic, without obvious abnormality     Lungs:   no increased work of breathing, no retractions, and clear to auscultation     Cardiovascular:   regular rate and rhythm     Musculoskeletal:   Tenderness on palpation of lower lumbar area.     Neurologic:   Mental Status Exam:  Memory:   Impaired.  Motor Exam:  moves all extremities well and symmetrically     Neuropsychiatric:   Affect: pleasant             Data:   CT OF THE HEAD WITHOUT CONTRAST 11/29/2024 12:34 PM      COMPARISON: None.     HISTORY: Dementia without behavioral disturbance (H).     TECHNIQUE: 5 mm thick axial CT images of the head were acquired  without IV contrast " material.     FINDINGS: There is moderate diffuse cerebral volume loss. There are  subtle patchy areas of decreased density in the cerebral white matter  bilaterally that are consistent with sequela of chronic small vessel  ischemic disease.     The ventricles and basal cisterns are within normal limits in  configuration given the degree of cerebral volume loss.  There is no  midline shift. There are no extra-axial fluid collections.     No intracranial hemorrhage, mass or recent infarct.     Probable large mucus retention cyst in the right maxillary sinus. The  paranasal sinuses are otherwise well aerated. There is no mastoiditis.  There are no fractures of the visualized bones.                                                                      IMPRESSION: Diffuse cerebral volume loss and cerebral white matter  changes consistent with chronic small vessel ischemic disease. No  evidence for acute intracranial pathology.     Radiation dose for this scan was reduced using automated exposure  control, adjustment of the mA and/or kV according to patient size, or  iterative reconstruction technique.      CLAUDIO BROWN MD      Disposition:  Follow-up in 4 weeks or as needed.    Lazarus Russell MD  Internal Medicine  [facility] Team

## 2024-12-09 ENCOUNTER — E-VISIT (OUTPATIENT)
Dept: FAMILY MEDICINE | Facility: CLINIC | Age: 74
End: 2024-12-09
Payer: COMMERCIAL

## 2024-12-09 DIAGNOSIS — M54.50 CHRONIC BILATERAL LOW BACK PAIN WITHOUT SCIATICA: ICD-10-CM

## 2024-12-09 DIAGNOSIS — G89.4 CHRONIC PAIN SYNDROME: ICD-10-CM

## 2024-12-09 DIAGNOSIS — G89.29 CHRONIC BILATERAL LOW BACK PAIN WITHOUT SCIATICA: ICD-10-CM

## 2024-12-10 ENCOUNTER — MYC MEDICAL ADVICE (OUTPATIENT)
Dept: FAMILY MEDICINE | Facility: CLINIC | Age: 74
End: 2024-12-10
Payer: COMMERCIAL

## 2024-12-10 RX ORDER — TRAMADOL HYDROCHLORIDE 50 MG/1
100 TABLET ORAL 2 TIMES DAILY PRN
Qty: 120 TABLET | Refills: 0 | Status: SHIPPED | OUTPATIENT
Start: 2024-12-11

## 2024-12-25 ENCOUNTER — PATIENT OUTREACH (OUTPATIENT)
Dept: CARE COORDINATION | Facility: CLINIC | Age: 74
End: 2024-12-25
Payer: COMMERCIAL

## 2024-12-27 NOTE — TELEPHONE ENCOUNTER
PRIOR AUTHORIZATION DENIED    Medication: TRAMADOL HCL  MG PO TB24  Insurance Company: [employer] - Phone [phone] Fax [phone]  Denial Date: 11/11/2024  Denial Reason(s): Insurance states that patient does not meet coverage criteria. Please see denial letter below for more details.     Appeal Information:       Patient Notified: NO

## 2025-01-06 DIAGNOSIS — M54.50 CHRONIC BILATERAL LOW BACK PAIN WITHOUT SCIATICA: ICD-10-CM

## 2025-01-06 DIAGNOSIS — G89.29 CHRONIC BILATERAL LOW BACK PAIN WITHOUT SCIATICA: ICD-10-CM

## 2025-01-06 DIAGNOSIS — G89.4 CHRONIC PAIN SYNDROME: ICD-10-CM

## 2025-01-07 ENCOUNTER — OFFICE VISIT (OUTPATIENT)
Dept: FAMILY MEDICINE | Facility: CLINIC | Age: 75
End: 2025-01-07
Payer: COMMERCIAL

## 2025-01-07 VITALS
RESPIRATION RATE: 16 BRPM | TEMPERATURE: 98.1 F | BODY MASS INDEX: 27.52 KG/M2 | WEIGHT: 165.2 LBS | HEIGHT: 65 IN | DIASTOLIC BLOOD PRESSURE: 72 MMHG | SYSTOLIC BLOOD PRESSURE: 133 MMHG | HEART RATE: 102 BPM | OXYGEN SATURATION: 98 %

## 2025-01-07 DIAGNOSIS — F11.20 UNCOMPLICATED OPIOID DEPENDENCE (H): ICD-10-CM

## 2025-01-07 DIAGNOSIS — G89.29 CHRONIC LEFT SACROILIAC JOINT PAIN: ICD-10-CM

## 2025-01-07 DIAGNOSIS — M54.50 CHRONIC MIDLINE LOW BACK PAIN WITHOUT SCIATICA: Primary | ICD-10-CM

## 2025-01-07 DIAGNOSIS — M53.3 CHRONIC LEFT SACROILIAC JOINT PAIN: ICD-10-CM

## 2025-01-07 DIAGNOSIS — G89.4 CHRONIC PAIN SYNDROME: ICD-10-CM

## 2025-01-07 DIAGNOSIS — F03.90 DEMENTIA WITHOUT BEHAVIORAL DISTURBANCE (H): Primary | ICD-10-CM

## 2025-01-07 DIAGNOSIS — G89.29 CHRONIC MIDLINE LOW BACK PAIN WITHOUT SCIATICA: ICD-10-CM

## 2025-01-07 DIAGNOSIS — G89.29 CHRONIC MIDLINE LOW BACK PAIN WITHOUT SCIATICA: Primary | ICD-10-CM

## 2025-01-07 DIAGNOSIS — M54.50 CHRONIC MIDLINE LOW BACK PAIN WITHOUT SCIATICA: ICD-10-CM

## 2025-01-07 DIAGNOSIS — F03.90 DEMENTIA WITHOUT BEHAVIORAL DISTURBANCE (H): ICD-10-CM

## 2025-01-07 PROCEDURE — G2211 COMPLEX E/M VISIT ADD ON: HCPCS | Performed by: INTERNAL MEDICINE

## 2025-01-07 PROCEDURE — 99214 OFFICE O/P EST MOD 30 MIN: CPT | Performed by: INTERNAL MEDICINE

## 2025-01-07 RX ORDER — PREGABALIN 25 MG/1
25-50 CAPSULE ORAL AT BEDTIME
Qty: 60 CAPSULE | Refills: 2 | Status: SHIPPED | OUTPATIENT
Start: 2025-01-07

## 2025-01-07 RX ORDER — TRAMADOL HYDROCHLORIDE 50 MG/1
TABLET ORAL
Qty: 120 TABLET | OUTPATIENT
Start: 2025-01-07

## 2025-01-07 RX ORDER — TRAMADOL HYDROCHLORIDE 50 MG/1
100 TABLET ORAL 2 TIMES DAILY PRN
Qty: 120 TABLET | Refills: 2 | Status: SHIPPED | OUTPATIENT
Start: 2025-01-07

## 2025-01-07 ASSESSMENT — PAIN SCALES - GENERAL: PAINLEVEL_OUTOF10: MILD PAIN (2)

## 2025-01-07 ASSESSMENT — ENCOUNTER SYMPTOMS: BACK PAIN: 1

## 2025-01-07 NOTE — PATIENT INSTRUCTIONS
At [facility], we strive to deliver an exceptional experience to you, every time we see you. If you receive a survey, please let us know what we are doing well and/or what we could improve upon, as we do value your feedback.  If you have MyChart, you can expect to receive results automatically within 24 hours of their completion.  Your provider will send a note interpreting your results as well.   If you do not have MyChart, you should receive your results in about a week by mail.    Your care team:                            Family Medicine Internal Medicine   MD Lazarus Bowles, MD Laura Jules, MD Gray Prasad, MD Carolyne Tyler, PAEstelaC    Prince Bucio, MD Pediatrics   Sherly Mcgraw, MD Nithya Winters, MD Ronda Lea, APRN CNP Cony Paulino APRN CNP   MD Leigha Cesar, MD Marylu Collazo, CNP     Bert Goodwin, CNP Same-Day Provider (No follow-up visits)   NANO Huff, DNP Monse Contreras, NANO Eldridge, FNP, BC RICHY RomanoC     Clinic hours: Monday - Thursday 7 am-6 pm; Fridays 7 am-5 pm.   Urgent care: Monday - Friday 10 am- 8 pm; Saturday and Sunday 9 am-5 pm.    Clinic: [phone]       [facility]: Monday - Thursday 8 am - 7 pm; Friday 8 am - 6 pm  [facility]: [phone]

## 2025-01-07 NOTE — PROGRESS NOTES
"  {PROVIDER CHARTING PREFERENCE:560671}    Skinny Salmeron is a 74 year old, presenting for the following health issues:  Back Pain and Results      1/7/2025     9:48 AM   Additional Questions   Roomed by eric   Accompanied by Sabrina- daughter         1/7/2025     9:48 AM   Patient Reported Additional Medications   Patient reports taking the following new medications no     Back Pain     History of Present Illness       Back Pain:  She presents for follow up of back pain. Patient's back pain is a chronic problem.  Location of back pain:  Left lower back  Description of back pain: dull ache  Back pain spreads: nowhere    Since patient first noticed back pain, pain is: unchanged  Does back pain interfere with her job:  Not applicable       She eats 0-1 servings of fruits and vegetables daily.She consumes 2 sweetened beverage(s) daily.She exercises with enough effort to increase her heart rate 9 or less minutes per day.  She exercises with enough effort to increase her heart rate 3 or less days per week.   She is taking medications regularly.       {MA/LPN/RN Pre-Provider Visit Orders- hCG/UA/Strep (Optional):865186}  {SUPERLIST (Optional):061494}  {additonal problems for provider to add (Optional):402537}    {ROS Picklists (Optional):445256}      Objective    BP (!) 160/80 (BP Location: Left arm, Patient Position: Sitting, Cuff Size: Adult Regular)   Pulse 102   Temp 98.1  F (36.7  C) (Oral)   Resp 16   Ht 1.651 m (5' 5\")   Wt 74.9 kg (165 lb 3.2 oz)   LMP  (LMP Unknown)   SpO2 98%   BMI 27.49 kg/m    Body mass index is 27.49 kg/m .  Physical Exam   {Exam List (Optional):542839}    {Diagnostic Test Results (Optional):668483}        Signed Electronically by: Lazarus Russell MD  {Email feedback regarding this note to [email or web address]   :408742}  " "    CONSTITUTIONAL: NEGATIVE for fever, chills, change in weight  INTEGUMENTARY/SKIN: NEGATIVE for worrisome rashes, moles or lesions  EYES: NEGATIVE for vision changes or irritation  ENT/MOUTH: NEGATIVE for ear, mouth and throat problems  RESP: NEGATIVE for significant cough or SOB  BREAST: NEGATIVE for masses, tenderness or discharge  CV: NEGATIVE for chest pain, palpitations or peripheral edema  GI: NEGATIVE for nausea, abdominal pain, heartburn, or change in bowel habits  : NEGATIVE for frequency, dysuria, or hematuria  MUSCULOSKELETAL:as above.  NEURO: NEGATIVE for weakness, dizziness or paresthesias  ENDOCRINE: NEGATIVE for temperature intolerance, skin/hair changes  HEME: NEGATIVE for bleeding problems  PSYCHIATRIC: NEGATIVE for changes in mood or affect            Medications:     Current Outpatient Medications   Medication Sig Dispense Refill    acetaminophen (TYLENOL) 500 MG tablet Take 2 tablets (1,000 mg) by mouth daily (with lunch). 60 tablet 0    cyanocobalamin (VITAMIN B-12) 1000 MCG tablet Take 1 tablet (1,000 mcg) by mouth daily. 100 tablet 3    pregabalin (LYRICA) 25 MG capsule Take 1-2 capsules (25-50 mg) by mouth at bedtime. 60 capsule 2    traMADol (ULTRAM) 50 MG tablet Take 2 tablets (100 mg) by mouth 2 times daily as needed for severe pain. 120 tablet 2     No current facility-administered medications for this visit.             Physical Exam:   /72 (BP Location: Left arm, Patient Position: Sitting, Cuff Size: Adult Regular)   Pulse 102   Temp 98.1  F (36.7  C) (Oral)   Resp 16   Ht 1.651 m (5' 5\")   Wt 74.9 kg (165 lb 3.2 oz)   LMP  (LMP Unknown)   SpO2 98%   BMI 27.49 kg/m     Constitutional:   fatigued, alert, cooperative, no apparent distress, and appears stated age     Eyes:   extra-ocular muscles intact     ENT:   normocephalic, without obvious abnormality     Lungs:   no increased work of breathing and no retractions     Cardiovascular:   regular rate and rhythm "     Musculoskeletal:   Tenderness on palpation of left sacroiliac joint.     Neurologic:   Mental Status Exam:  Level of Alertness:   awake  Orientation:   person, place  Memory:   abnormal   Fund of Knowledge:  abnormal   Attention/Concentration:  normal  Language:  normal  Motor Exam:  moves all extremities well and symmetrically     Neuropsychiatric:   Affect: pleasant             Data:   EXAM: PET BRAIN AMYLOID  1/3/2025 2:46 PM      Amyvid (Florbetapir F18) PET of the brain     HISTORY: Dementia without behavioral disturbance (H)        COMPARISON: CT head 11/29/2024     TECHNIQUE: Dose:  11.61 mCi, 55 minutes uptake, Matrix size: 256, Type  of acquisition: 3D, Reconstruction method: Iterative reconstruction  using TOF, Attenuation correction: CT.  Regional cortical tracer uptake (RCTU) was measured for 4 parts of the  brain. From these RCTU scores, brain amyloid plaque load (BAPL) score  was calculated.     Findings: RCTU:   Temporal lobes: 1  Frontal lobes: 1  Precuneus and cingulate region: 1  Parietal lobes: 1     BAPL score: 1     CT: Mild periventricular and subcortical white matter hypodensities,  likely secondary to chronic small vessel ischemic disease. Mild  cerebral and cerebellar volume loss. Mucosal thickening of the right  greater than left maxillary sinus. Multiple dental caries and apical  lucencies.                                                                      Impression:   Negative scan without amyloid deposition.     -------------------  Amyvid is a radioactive diagnostic agent indicated for Positron  Emission Tomography (PET) imaging of the brain to estimate ?-amyloid  neuritic plaque density in adult patients with cognitive impairment  who are being evaluated for Alzheimer?s Disease (AD) and other causes  of cognitive decline. A negative Amyvid scan indicates sparse to no  neuritic plaques and is inconsistent with a neuropathological  diagnosis of AD at the time of image acquisition;  a negative scan  result reduces the likelihood that a patient?s cognitive impairment is  due to AD. A positive Amyvid scan indicates moderate to frequent  amyloid neuritic plaques; neuropathological examination has shown this  amount of amyloid neuritic plaque is present in patients with AD, but  may also be present in patients with other types of neurologic  conditions as well as older people with normal cognition. Amyvid is an  adjunct to other diagnostic evaluations.     RCTU:  1: No tracer uptake by the gray matter that is greater than the white  matter  2. Moderate tracer uptake. Smaller areas of GM uptake equal to or  greater than WM  3. Pronounced tracer uptake. A large confluent area of GM uptake equal  to or greater than WM uptake     BAPL:   Negative scan  1. Scan without amyloid deposition (RCTU '1' in each 4 regions)  Positive scan  2. Scan with moderate amyloid deposition (RCTU '2' in any or all  regions, no RCTU of '3')  3. Scan with pronounced amyloid deposition (RCTU '3' in at least 1  region).     SUVr cut off values to be considered amyloid positive for Florbetapir  (Amyvid) is 1.10 with reference below:     Michael SWAIN, Jordan AD, Pedro I, Maria Guadalupe WE, Thang CC, Shady DF,  Elina MJ, Malena LAI, Thuan ALVAREZ Sr. Standardization of amyloid  quantitation with florbetapir standardized uptake value ratios to the  Centiloid scale. Alzheimers Susanne. 2018 Dec;14(12):0436-4614. doi:  10.1016/j.jalz.2018.06.1353. Epub 2018 Jul 11. PMID: 33332301.     I have personally reviewed the examination and initial interpretation  and I agree with the findings.     KIMBERLY SIMON MD     Disposition:  Follow-up in 12 weeks or as needed.      Lazarus Russell MD  Internal Medicine  [facility] Team

## 2025-01-22 ENCOUNTER — RADIOLOGY INJECTION OFFICE VISIT (OUTPATIENT)
Dept: PALLIATIVE MEDICINE | Facility: CLINIC | Age: 75
End: 2025-01-22
Attending: INTERNAL MEDICINE
Payer: COMMERCIAL

## 2025-01-22 VITALS — SYSTOLIC BLOOD PRESSURE: 154 MMHG | OXYGEN SATURATION: 97 % | DIASTOLIC BLOOD PRESSURE: 71 MMHG | HEART RATE: 86 BPM

## 2025-01-22 DIAGNOSIS — M47.816 SPONDYLOSIS OF LUMBAR REGION WITHOUT MYELOPATHY OR RADICULOPATHY: Primary | ICD-10-CM

## 2025-01-22 DIAGNOSIS — M47.816 LUMBAR FACET ARTHROPATHY: ICD-10-CM

## 2025-01-22 DIAGNOSIS — M47.812 SPONDYLOSIS OF CERVICAL REGION WITHOUT MYELOPATHY OR RADICULOPATHY: ICD-10-CM

## 2025-01-22 PROCEDURE — 64493 INJ PARAVERT F JNT L/S 1 LEV: CPT | Mod: LT | Performed by: PAIN MEDICINE

## 2025-01-22 RX ORDER — TRIAMCINOLONE ACETONIDE 40 MG/ML
40 INJECTION, SUSPENSION INTRA-ARTICULAR; INTRAMUSCULAR ONCE
Status: COMPLETED | OUTPATIENT
Start: 2025-01-22 | End: 2025-01-22

## 2025-01-22 RX ADMIN — TRIAMCINOLONE ACETONIDE 40 MG: 40 INJECTION, SUSPENSION INTRA-ARTICULAR; INTRAMUSCULAR at 15:11

## 2025-01-22 ASSESSMENT — PAIN SCALES - GENERAL
PAINLEVEL_OUTOF10: NO PAIN (0)
PAINLEVEL_OUTOF10: MODERATE PAIN (4)

## 2025-01-22 NOTE — PROGRESS NOTES
Pre procedure Diagnosis: lumbar spondylosis without myelopathy   Post procedure Diagnosis: Same  Procedure performed: left facet joint injections at L4-5, fluoroscopically guided, contrast controlled  Indication:  Therapeutic for pain  Anesthesia: none  Complication:  none  Operators: Bernardo Guajardo MD    Indications:   Faviola Bolivar is a 75 year old female was sent  for lumbar facet  The patient has a history of axial lbp.  Exam shows + kemps and reproduction of pain with extension and lateral rotation.  They have tried conservative treatment including meds/pt.    Options/alternatives, benefits and risks were discussed with the patient including bleeding, infection, flared pain, tissue trauma, exposure to radiation, reaction to medications including seizure, spinal cord injury, paralysis, weakness, numbness and headache.     Questions were answered to her satisfaction and she wishes to proceed. Voluntary informed consent was obtained and signed.     Vitals were reviewed: Yes  Allergies were reviewed:  Yes   Medications were reviewed:  Yes   Pre-procedure pain score: 4/10    Procedure:  After obtaining signed informed consent, the patient was brought into the procedure suite and was placed in a prone position on the procedure table.   A Pause for the Cause was performed.  The patient was prepped and draped in the usual sterile fashion.     Under AP fluoroscopic guidance the L4-5 facet joints on the left side were identified, and the C-arm was rotated obliquely to the affected side to open the joint space. A total of 2 ml of 1% lidocaine was injected at the needle entry point and needle tract. Then a 22 gauge 3.5 inch spinal needle was inserted and advanced under fluoroscopic guidance targeting the superior articular process of each joint. Once the needle*made contact with the SAP, it was rotated and advanced into the joint.    AP and lateral fluoroscopic views were obtained to confirm the needle placement.  Then,  Omnipaque 1 contrast dye was injected after negative aspiration for heme and CSF in each joint, confirming appropriate needle placement.  A total of 1ml of Omnipaque was used.  Omnipaque wasted:  9 ml.    Then, each joint was injected with 1 ml of a combination of Kenalog 40 mg and 0.25% bupivacaine 1 ml (total injectate volume 2 ml) and the needles were flushed with local anesthetic as they were withdrawn.       Hemostasis was achieved, the area was cleaned, and bandaids were placed when appropriate.  The patient tolerated the procedure well, and was taken to the recovery room.    Images were saved to PACS.    Post-procedure pain score: 0/10  Follow-up includes:   -f/u 8wks    Bernardo Guajardo MD  [facility]

## 2025-01-22 NOTE — NURSING NOTE
Pre-procedure Intake  If YES to any questions or NO to having a   Please complete laminated checklist and leave on the computer keyboard for Provider, verbally inform provider if able.    For SCS Trial, RFA's or any sedation procedure:  Have you been fasting? NA  If yes, for how long?     Are you taking any any blood thinners such as Coumadin, Warfarin, Jantoven, Pradaxa Xarelto, Eliquis, Edoxaban, Enoxaparin, Lovenox, Heparin, Arixtra, Fondaparinux, or Fragmin? OR Antiplatelet medication such as Plavix, Brilinta, or Effient?   No   If yes, when did you take your last dose?     Do you take aspirin?  No  If cervical procedure, have you held aspirin for 6 days?   No     Is the Pt taking any GLP-1 Antagonist (hold needed for sedation patients only)  (semaglutide (Ozempic, Wegovy), dulaglutide (Trulicity), exenatide ER (Bydureon), tirzepatide (Mounjaro), Liraglutide (Saxenda, Victoza), semaglutide (Rybelsus)     No   If yes, when did you take your last dose?     Do you have any allergies to contrast dye, iodine, steroid and/or numbing medications?  NO    Are you currently taking antibiotics or have an active infection?  NO    Have you had a fever/elevated temperature within the past week? NO    Are you currently taking oral steroids? No     Do you have a ? Yes    Are you pregnant or breastfeeding?  NO    Have you received any vaccinations in the last week? NO    Notify provider and RNs if systolic BP >170, diastolic BP >100, P >100 or O2 sats < 90%

## 2025-01-22 NOTE — PATIENT INSTRUCTIONS
[facility]   Procedure Discharge Instructions    Today you saw:    Dr. Bernardo Guajardo,         You had a(n):  Facet joint injection left    Medications used for lumbar facet: Lidocaine, Omnipaque, Dexamethasone, Bupivicaine          Be cautious with walking. Numbness and/or weakness in the lower extremities may occur for up to 6-8 hours after the procedure due to effect of the local anesthetic  Do not drive for 6 hours. The effect of the local anesthetic could slow your reflexes.   You may resume your regular activities after 24 hours  Avoid strenuous activity for the first 24 hours  You may shower, however avoid swimming, tub baths or hot tubs for 24 hours following your procedure  You may have a mild to moderate increase in pain for several days following the injection.  It may take up to 14 days for the steroid medication to start working although you may feel the effect as early as a few days after the procedure.     You may use ice packs for 10-15 minutes, 3 to 4 times a day at the injection site for comfort  Do not use heat to painful areas for 6 to 8 hours. This will give the local anesthetic time to wear off and prevent you from accidentally burning your skin.   Unless you have been directed to avoid the use of anti-inflammatory medications (NSAIDS), you may use medications such as ibuprofen, Aleve or Tylenol for pain control if needed.   If you have diabetes, check your blood sugar more frequently than usual as your blood sugar may be higher than normal for 10-14 days following a steroid injection. Contact your doctor who manages your diabetes if your blood sugar is higher than usual  Possible side effects of steroids that you may experience include flushing, elevated blood pressure, increased appetite, mild headaches and restlessness.  All of these symptoms will get better with time.  If you experience any of the following, call the Pain Clinic during work hours (Mon-Friday  8-4:30 pm) at [phone] or the Provider Line after hours at [phone]:  -Fever over 100 degree F  -Swelling, bleeding, redness, drainage, warmth at the injection site  -Progressive weakness or numbness in your legs  -Loss of bowel or bladder function  -Unusual headache not relieved by Tylenol or other pain reliever  -Unusual new onset of pain that is not improving

## 2025-01-22 NOTE — NURSING NOTE
Discharge Information    IV Discontiued Time:  NA    Amount of Fluid Infused:  NA    Discharge Criteria = When patient returns to baseline or as per MD order    Consciousness:  Pt is fully awake    Circulation:  BP +/- 20% of pre-procedure level    Respiration:  Patient is able to breathe deeply    O2 Sat:  Patient is able to maintain O2 Sat >92% on room air    Activity:  Moves 4 extremities on command    Ambulation:  Patient is able to stand and walk or stand and pivot into wheelchair    Dressing:  Clean/dry or No Dressing    Notes:   Discharge instructions and AVS given to patient    Patient meets criteria for discharge?  YES    Admitted to PCU?  No    Responsible adult present to accompany patient home?  Yes    Signature/Title:    Susanne Terrazas RN  RN Care Coordinator  [facility] [place]

## 2025-03-03 ENCOUNTER — E-VISIT (OUTPATIENT)
Dept: FAMILY MEDICINE | Facility: CLINIC | Age: 75
End: 2025-03-03
Payer: COMMERCIAL

## 2025-03-03 DIAGNOSIS — F03.90 SENILE DEMENTIA WITHOUT BEHAVIORAL DISTURBANCE (H): Primary | ICD-10-CM

## 2025-03-04 ENCOUNTER — PATIENT OUTREACH (OUTPATIENT)
Dept: CARE COORDINATION | Facility: CLINIC | Age: 75
End: 2025-03-04
Payer: COMMERCIAL

## 2025-03-04 NOTE — PROGRESS NOTES
Clinic Care Coordination Contact  Community Health Worker Initial Outreach    CHW Initial Information Gathering:  Referral Source: PCP  Current living arrangement:: Not Assessed  Informal Support system:: Children, Family  No PCP office visit in Past Year: No  CHW Additional Questions  If ED/Hospital discharge, follow-up appointment scheduled as recommended?: N/A  Medication changes made following ED/Hospital discharge?: N/A  MyChart active?: Yes  Patient sent Social Drivers of Health questionnaire?: No    Patient accepts CC: No, Patient's Daughter (Sabrina) expressed that she would like to follow up with CCC post appointment with neurology to further access additional support that this needed. Further expressing no additional support is needed at this time. Patient will be sent Care Coordination introduction letter for future reference.     Henna Connors  Community Health Worker    Connected Care Resources   [facility]     *Connected Care Resources Team does NOT   follow patient ongoing. Referrals are identified   based on internal discharge report and the outreach is to ensure   Patient has understanding of their discharge instructions.

## 2025-03-04 NOTE — LETTER
[facility] CARE COORDINATION  [address]  [place] [address]    March 4, 2025    Faviola Bolivar  [address]   [place] [address]      Dear Faviola,    I am a clinic community health worker who works with Lazarus Russell MD with the [facility]. I wanted to thank you for spending the time to talk with me.  Below is a description of clinic care coordination and how I can further assist you.       The clinic care coordination team is made up of a registered nurse, , financial resource worker and community health worker who understand the health care system. The goal of clinic care coordination is to help you manage your health and improve access to the health care system. Our team works alongside your provider to assist you in determining your health and social needs. We can help you obtain health care and community resources, providing you with necessary information and education. We can work with you through any barriers and develop a care plan that helps coordinate and strengthen the communication between you and your care team.  Our services are voluntary and are offered without charge to you personally.    Please feel free to contact Community Health WorkerStephani at [phone] with any questions or concerns regarding care coordination and what we can offer.      We are focused on providing you with the highest-quality healthcare experience possible.    Sincerely,     Henna ABRAHAM  Community Health Worker    Connected Care Resources   [facility]    yes

## 2025-03-07 ENCOUNTER — TRANSFERRED RECORDS (OUTPATIENT)
Dept: HEALTH INFORMATION MANAGEMENT | Facility: CLINIC | Age: 75
End: 2025-03-07
Payer: COMMERCIAL

## 2025-04-02 ENCOUNTER — PATIENT OUTREACH (OUTPATIENT)
Dept: CARE COORDINATION | Facility: CLINIC | Age: 75
End: 2025-04-02
Payer: COMMERCIAL